# Patient Record
Sex: MALE | Race: WHITE | NOT HISPANIC OR LATINO | Employment: FULL TIME | URBAN - METROPOLITAN AREA
[De-identification: names, ages, dates, MRNs, and addresses within clinical notes are randomized per-mention and may not be internally consistent; named-entity substitution may affect disease eponyms.]

---

## 2017-07-11 ENCOUNTER — APPOINTMENT (OUTPATIENT)
Dept: RADIOLOGY | Facility: MEDICAL CENTER | Age: 22
End: 2017-07-11
Attending: EMERGENCY MEDICINE
Payer: COMMERCIAL

## 2017-07-11 ENCOUNTER — HOSPITAL ENCOUNTER (EMERGENCY)
Facility: MEDICAL CENTER | Age: 22
End: 2017-07-12
Attending: EMERGENCY MEDICINE
Payer: COMMERCIAL

## 2017-07-11 DIAGNOSIS — R00.2 PALPITATIONS: ICD-10-CM

## 2017-07-11 LAB
ALBUMIN SERPL BCP-MCNC: 4.5 G/DL (ref 3.2–4.9)
ALBUMIN/GLOB SERPL: 1.5 G/DL
ALP SERPL-CCNC: 48 U/L (ref 30–99)
ALT SERPL-CCNC: 22 U/L (ref 2–50)
ANION GAP SERPL CALC-SCNC: 12 MMOL/L (ref 0–11.9)
AST SERPL-CCNC: 21 U/L (ref 12–45)
BASOPHILS # BLD AUTO: 0.6 % (ref 0–1.8)
BASOPHILS # BLD: 0.04 K/UL (ref 0–0.12)
BILIRUB SERPL-MCNC: 1.1 MG/DL (ref 0.1–1.5)
BUN SERPL-MCNC: 14 MG/DL (ref 8–22)
CALCIUM SERPL-MCNC: 9.5 MG/DL (ref 8.4–10.2)
CHLORIDE SERPL-SCNC: 101 MMOL/L (ref 96–112)
CO2 SERPL-SCNC: 24 MMOL/L (ref 20–33)
CREAT SERPL-MCNC: 0.76 MG/DL (ref 0.5–1.4)
DEPRECATED D DIMER PPP IA-ACNC: <200 NG/ML(D-DU)
EKG IMPRESSION: NORMAL
EOSINOPHIL # BLD AUTO: 0.03 K/UL (ref 0–0.51)
EOSINOPHIL NFR BLD: 0.4 % (ref 0–6.9)
ERYTHROCYTE [DISTWIDTH] IN BLOOD BY AUTOMATED COUNT: 38.5 FL (ref 35.9–50)
GFR SERPL CREATININE-BSD FRML MDRD: >60 ML/MIN/1.73 M 2
GLOBULIN SER CALC-MCNC: 3 G/DL (ref 1.9–3.5)
GLUCOSE SERPL-MCNC: 85 MG/DL (ref 65–99)
HCT VFR BLD AUTO: 45.6 % (ref 42–52)
HGB BLD-MCNC: 16.1 G/DL (ref 14–18)
IMM GRANULOCYTES # BLD AUTO: 0.01 K/UL (ref 0–0.11)
IMM GRANULOCYTES NFR BLD AUTO: 0.1 % (ref 0–0.9)
LYMPHOCYTES # BLD AUTO: 2.84 K/UL (ref 1–4.8)
LYMPHOCYTES NFR BLD: 40.6 % (ref 22–41)
MCH RBC QN AUTO: 31.6 PG (ref 27–33)
MCHC RBC AUTO-ENTMCNC: 35.3 G/DL (ref 33.7–35.3)
MCV RBC AUTO: 89.6 FL (ref 81.4–97.8)
MONOCYTES # BLD AUTO: 0.46 K/UL (ref 0–0.85)
MONOCYTES NFR BLD AUTO: 6.6 % (ref 0–13.4)
NEUTROPHILS # BLD AUTO: 3.61 K/UL (ref 1.82–7.42)
NEUTROPHILS NFR BLD: 51.7 % (ref 44–72)
NRBC # BLD AUTO: 0 K/UL
NRBC BLD AUTO-RTO: 0 /100 WBC
PLATELET # BLD AUTO: 263 K/UL (ref 164–446)
PMV BLD AUTO: 10 FL (ref 9–12.9)
POTASSIUM SERPL-SCNC: 3.5 MMOL/L (ref 3.6–5.5)
PROT SERPL-MCNC: 7.5 G/DL (ref 6–8.2)
RBC # BLD AUTO: 5.09 M/UL (ref 4.7–6.1)
SODIUM SERPL-SCNC: 137 MMOL/L (ref 135–145)
TROPONIN I SERPL-MCNC: <0.02 NG/ML (ref 0–0.04)
TSH SERPL DL<=0.005 MIU/L-ACNC: 1.14 UIU/ML (ref 0.35–5.5)
WBC # BLD AUTO: 7 K/UL (ref 4.8–10.8)

## 2017-07-11 PROCEDURE — 84443 ASSAY THYROID STIM HORMONE: CPT

## 2017-07-11 PROCEDURE — 99284 EMERGENCY DEPT VISIT MOD MDM: CPT

## 2017-07-11 PROCEDURE — 93005 ELECTROCARDIOGRAM TRACING: CPT

## 2017-07-11 PROCEDURE — 36415 COLL VENOUS BLD VENIPUNCTURE: CPT

## 2017-07-11 PROCEDURE — 80053 COMPREHEN METABOLIC PANEL: CPT

## 2017-07-11 PROCEDURE — 84484 ASSAY OF TROPONIN QUANT: CPT

## 2017-07-11 PROCEDURE — 85379 FIBRIN DEGRADATION QUANT: CPT

## 2017-07-11 PROCEDURE — 85025 COMPLETE CBC W/AUTO DIFF WBC: CPT

## 2017-07-11 PROCEDURE — 71020 DX-CHEST-2 VIEWS: CPT

## 2017-07-11 ASSESSMENT — PAIN SCALES - GENERAL
PAINLEVEL_OUTOF10: 2
PAINLEVEL_OUTOF10: 0

## 2017-07-11 NOTE — ED AVS SNAPSHOT
[employer] Access Code: [email or web address]  Expires: 8/11/2017 12:11 AM    [employer]  A secure, online tool to manage your health information     [employer]’s [employer]® is a secure, online tool that connects you to your personalized health information from the privacy of your home -- day or night - making it very easy for you to manage your healthcare. Once the activation process is completed, you can even access your medical information using the [employer] jb, which is available for free in the Apple Jb store or Google Play store.     [employer] provides the following levels of access (as shown below):   My Chart Features   [facility] Primary Care Doctor [facility]  Specialists [facility]  Urgent  Care Non-[facility]  Primary Care  Doctor   Email your healthcare team securely and privately 24/7 X X X X   Manage appointments: schedule your next appointment; view details of past/upcoming appointments X      Request prescription refills. X      View recent personal medical records, including lab and immunizations X X X X   View health record, including health history, allergies, medications X X X X   Read reports about your outpatient visits, procedures, consult and ER notes X X X X   See your discharge summary, which is a recap of your hospital and/or ER visit that includes your diagnosis, lab results, and care plan. X X       How to register for [employer]:  1. Go to  [email or web address].  2. Click on the Sign Up Now box, which takes you to the New Member Sign Up page. You will need to provide the following information:  a. Enter your [employer] Access Code exactly as it appears at the top of this page. (You will not need to use this code after you’ve completed the sign-up process. If you do not sign up before the expiration date, you must request a new code.)   b. Enter your date of birth.   c. Enter your home email address.   d. Click Submit, and follow the next screen’s instructions.  3. Create a [employer] ID. This will be your [employer]  login ID and cannot be changed, so think of one that is secure and easy to remember.  4. Create a i[employer]fm password. You can change your password at any time.  5. Enter your Password Reset Question and Answer. This can be used at a later time if you forget your password.   6. Enter your e-mail address. This allows you to receive e-mail notifications when new information is available in iMedia.fm.  7. Click Sign Up. You can now view your health information.    For assistance activating your [employer]fm account, call [phone]

## 2017-07-11 NOTE — ED AVS SNAPSHOT
7/12/2017    Jersey Engel  [address] [place] [address]    Dear Jersey:    [facility] wants to ensure your discharge home is safe and you or your loved ones have had all of your questions answered regarding your care after you leave the hospital.    Below is a list of resources and contact information should you have any questions regarding your hospital stay, follow-up instructions, or active medical symptoms.    Questions or Concerns Regarding… Contact   Medical Questions Related to Your Discharge  (7 days a week, 8am-5pm) Contact a Nurse Care Coordinator   [phone]   Medical Questions Not Related to Your Discharge  (24 hours a day / 7 days a week)  Contact the Nurse Health Line   [phone]    Medications or Discharge Instructions Refer to your discharge packet   or contact your [facility] Primary Care Provider   [phone]   Follow-up Appointment(s) Schedule your appointment via IPDIA   or contact Scheduling [phone]   Billing Review your statement via IPDIA  or contact Billing [phone]   Medical Records Review your records via IPDIA   or contact Medical Records [phone]     You may receive a telephone call within two days of discharge. This call is to make certain you understand your discharge instructions and have the opportunity to have any questions answered. You can also easily access your medical information, test results and upcoming appointments via the IPDIA free online health management tool. You can learn more and sign up at [employer]Dex/IPDIA. For assistance setting up your IPDIA account, please call [phone].    Once again, we want to ensure your discharge home is safe and that you have a clear understanding of any next steps in your care. If you have any questions or concerns, please do not hesitate to contact us, we are here for you. Thank you for choosing [facility] for your healthcare needs.    Sincerely,    Your [facility] Healthcare Team

## 2017-07-11 NOTE — ED AVS SNAPSHOT
Home Care Instructions                                                                                                                Jersey Engel   MRN: [id number]    Department:  [facility], Emergency Dept   Date of Visit:  7/11/2017            [facility], Emergency Dept    [address]    [place] [address]    Phone:  [phone]      You were seen by     Liliana Doherty M.D.      Your Diagnosis Was     Palpitations     R00.2       Follow-up Information     1. Call [facility]ARDIOLOG[facility].    Specialty:  Cardiology    Why:  please call tomorrow morning to schedule follow-up appointment    Contact information    [id number] [address]  [phone]        2. Go to [facility], Emergency Dept.    Specialty:  Emergency Medicine    Why:  If symptoms worsen    Contact information    [id number] [phone]      Medication Information     Review all of your home medications and newly ordered medications with your primary doctor and/or pharmacist as soon as possible. Follow medication instructions as directed by your doctor and/or pharmacist.     Please keep your complete medication list with you and share with your physician. Update the information when medications are discontinued, doses are changed, or new medications (including over-the-counter products) are added; and carry medication information at all times in the event of emergency situations.               Medication List      ASK your doctor about these medications        Instructions    Morning Afternoon Evening Bedtime    ondansetron 4 MG Tabs tablet   Commonly known as:  ZOFRAN        Take 1 Tab by mouth every 8 hours as needed for Nausea/Vomiting.   Dose:  4 mg                                Procedures and tests performed during your visit     CBC w/ Differential    Complete Metabolic Panel (CMP)    D-DIMER    DX-CHEST-2 VIEWS    EKG (NOW)    ESTIMATED GFR    IV Saline Lock    TSH (for screening thyroid dysfunction)    Troponin STAT        Discharge Instructions       Please follow up with the cardiology clinic listed. Come tomorrow to schedule a follow-up appointment for further monitoring if indicated. Return to the emergency department if you develop pain, worsening symptoms, lightheadedness, shortness of breath or any further concerns.    Palpitations  A palpitation is the feeling that your heartbeat is irregular or is faster than normal. It may feel like your heart is fluttering or skipping a beat. Palpitations are usually not a serious problem. However, in some cases, you may need further medical evaluation.  CAUSES   Palpitations can be caused by:  · Smoking.  · Caffeine or other stimulants, such as diet pills or energy drinks.  · Alcohol.  · Stress and anxiety.  · Strenuous physical activity.  · Fatigue.  · Certain medicines.  · Heart disease, especially if you have a history of irregular heart rhythms (arrhythmias), such as atrial fibrillation, atrial flutter, or supraventricular tachycardia.  · An improperly working pacemaker or defibrillator.  DIAGNOSIS   To find the cause of your palpitations, your health care provider will take your medical history and perform a physical exam. Your health care provider may also have you take a test called an ambulatory electrocardiogram (ECG). An ECG records your heartbeat patterns over a 24-hour period. You may also have other tests, such as:  · Transthoracic echocardiogram (TTE). During echocardiography, sound waves are used to evaluate how blood flows through your heart.  · Transesophageal echocardiogram (EARNEST).  · Cardiac monitoring. This allows your health care provider to monitor your heart rate and rhythm in real time.  · Holter monitor. This is a portable device that records your heartbeat and can help diagnose heart arrhythmias. It allows your health care provider to track your heart  activity for several days, if needed.  · Stress tests by exercise or by giving medicine that makes the heart beat faster.  TREATMENT   Treatment of palpitations depends on the cause of your symptoms and can vary greatly. Most cases of palpitations do not require any treatment other than time, relaxation, and monitoring your symptoms. Other causes, such as atrial fibrillation, atrial flutter, or supraventricular tachycardia, usually require further treatment.  HOME CARE INSTRUCTIONS   · Avoid:  ¨ Caffeinated coffee, tea, soft drinks, diet pills, and energy drinks.  ¨ Chocolate.  ¨ Alcohol.  · Stop smoking if you smoke.  · Reduce your stress and anxiety. Things that can help you relax include:  ¨ A method of controlling things in your body, such as your heartbeats, with your mind (biofeedback).  ¨ Yoga.  ¨ Meditation.  ¨ Physical activity such as swimming, jogging, or walking.  · Get plenty of rest and sleep.  SEEK MEDICAL CARE IF:   · You continue to have a fast or irregular heartbeat beyond 24 hours.  · Your palpitations occur more often.  SEEK IMMEDIATE MEDICAL CARE IF:  · You have chest pain or shortness of breath.  · You have a severe headache.  · You feel dizzy or you faint.  MAKE SURE YOU:  · Understand these instructions.  · Will watch your condition.  · Will get help right away if you are not doing well or get worse.     This information is not intended to replace advice given to you by your health care provider. Make sure you discuss any questions you have with your health care provider.     Document Released: 12/15/2001 Document Revised: 12/23/2014 Document Reviewed: 02/15/2013  Elsevier Interactive Patient Education ©2016 [employer] Inc.            Patient Information     Patient Information    Following emergency treatment: all patient requiring follow-up care must return either to a private physician or a clinic if your condition worsens before you are able to obtain further medical attention, please  return to the emergency room.     Billing Information    At [facility], we work to make the billing process streamlined for our patients.  Our Representatives are here to answer any questions you may have regarding your hospital bill.  If you have insurance coverage and have supplied your insurance information to us, we will submit a claim to your insurer on your behalf.  Should you have any questions regarding your bill, we can be reached online or by phone as follows:  Online: You are able pay your bills online or live chat with our representatives about any billing questions you may have. We are here to help Monday - Friday from 8:00am to 7:30pm and 9:00am - 12:00pm on Saturdays.  Please visit [email or web address]  for more information.   Phone:  [phone] or [phone]    Please note that your emergency physician, surgeon, pathologist, radiologist, anesthesiologist, and other specialists are not employed by [facility] and will therefore bill separately for their services.  Please contact them directly for any questions concerning their bills at the numbers below:     Emergency Physician Services:  [phone]  [facility]:  [phone]  [facility]:  [phone]  [facility]:  [phone]    1. Your final bill may vary from the amount quoted upon discharge if all procedures are not complete at that time, or if your doctor has additional procedures of which we are not aware. You will receive an additional bill if you return to the Emergency Department at [facility] for suture removal regardless of the facility of which the sutures were placed.     2. Please arrange for settlement of this account at the emergency registration.    3. All self-pay accounts are due in full at the time of treatment.  If you are unable to meet this obligation then payment is expected within 4-5 days.     4. If you have had radiology studies  (CT, X-ray, Ultrasound, MRI), you have received a preliminary result during your emergency department visit. Please contact the radiology department [phone] to receive a copy of your final result. Please discuss the Final result with your primary physician or with the follow up physician provided.     Crisis Hotline:  [place] Crisis Hotline:  8-063-CMHMOCA or 1-627.855.1349  [place] Crisis Hotline:    1-443.551.5156 or 572-433-1836         ED Discharge Follow Up Questions    1. In order to provide you with very good care, we would like to follow up with a phone call in the next few days.  May we have your permission to contact you?     YES /  NO    2. What is the best phone number to call you? (       )_____-__________    3. What is the best time to call you?      Morning  /  Afternoon  /  Evening                   Patient Signature:  ____________________________________________________________    Date:  ____________________________________________________________

## 2017-07-12 VITALS
WEIGHT: 148.81 LBS | OXYGEN SATURATION: 98 % | BODY MASS INDEX: 23.36 KG/M2 | RESPIRATION RATE: 20 BRPM | HEART RATE: 64 BPM | HEIGHT: 67 IN | TEMPERATURE: 98.9 F | DIASTOLIC BLOOD PRESSURE: 93 MMHG | SYSTOLIC BLOOD PRESSURE: 147 MMHG

## 2017-07-12 PROCEDURE — 99284 EMERGENCY DEPT VISIT MOD MDM: CPT

## 2017-07-12 NOTE — ED NOTES
"Pt bib self with c/o of palpitations. Pt states he was having \"weird heart beats all day. It would speed up then feel really heavy.\" Denies SOB or difficulty breathing.   "

## 2017-07-12 NOTE — DISCHARGE INSTRUCTIONS
Please follow up with the cardiology clinic listed. Come tomorrow to schedule a follow-up appointment for further monitoring if indicated. Return to the emergency department if you develop pain, worsening symptoms, lightheadedness, shortness of breath or any further concerns.    Palpitations  A palpitation is the feeling that your heartbeat is irregular or is faster than normal. It may feel like your heart is fluttering or skipping a beat. Palpitations are usually not a serious problem. However, in some cases, you may need further medical evaluation.  CAUSES   Palpitations can be caused by:  · Smoking.  · Caffeine or other stimulants, such as diet pills or energy drinks.  · Alcohol.  · Stress and anxiety.  · Strenuous physical activity.  · Fatigue.  · Certain medicines.  · Heart disease, especially if you have a history of irregular heart rhythms (arrhythmias), such as atrial fibrillation, atrial flutter, or supraventricular tachycardia.  · An improperly working pacemaker or defibrillator.  DIAGNOSIS   To find the cause of your palpitations, your health care provider will take your medical history and perform a physical exam. Your health care provider may also have you take a test called an ambulatory electrocardiogram (ECG). An ECG records your heartbeat patterns over a 24-hour period. You may also have other tests, such as:  · Transthoracic echocardiogram (TTE). During echocardiography, sound waves are used to evaluate how blood flows through your heart.  · Transesophageal echocardiogram (EARNEST).  · Cardiac monitoring. This allows your health care provider to monitor your heart rate and rhythm in real time.  · Holter monitor. This is a portable device that records your heartbeat and can help diagnose heart arrhythmias. It allows your health care provider to track your heart activity for several days, if needed.  · Stress tests by exercise or by giving medicine that makes the heart beat faster.  TREATMENT   Treatment  of palpitations depends on the cause of your symptoms and can vary greatly. Most cases of palpitations do not require any treatment other than time, relaxation, and monitoring your symptoms. Other causes, such as atrial fibrillation, atrial flutter, or supraventricular tachycardia, usually require further treatment.  HOME CARE INSTRUCTIONS   · Avoid:  ¨ Caffeinated coffee, tea, soft drinks, diet pills, and energy drinks.  ¨ Chocolate.  ¨ Alcohol.  · Stop smoking if you smoke.  · Reduce your stress and anxiety. Things that can help you relax include:  ¨ A method of controlling things in your body, such as your heartbeats, with your mind (biofeedback).  ¨ Yoga.  ¨ Meditation.  ¨ Physical activity such as swimming, jogging, or walking.  · Get plenty of rest and sleep.  SEEK MEDICAL CARE IF:   · You continue to have a fast or irregular heartbeat beyond 24 hours.  · Your palpitations occur more often.  SEEK IMMEDIATE MEDICAL CARE IF:  · You have chest pain or shortness of breath.  · You have a severe headache.  · You feel dizzy or you faint.  MAKE SURE YOU:  · Understand these instructions.  · Will watch your condition.  · Will get help right away if you are not doing well or get worse.     This information is not intended to replace advice given to you by your health care provider. Make sure you discuss any questions you have with your health care provider.     Document Released: 12/15/2001 Document Revised: 12/23/2014 Document Reviewed: 02/15/2013  [employer] Interactive Patient Education ©2016 [employer]

## 2017-07-12 NOTE — ED PROVIDER NOTES
"ED Provider Note    Chief Complaint:   Palpitations    HPI:  Jersey Engel is a 22 y.o. male who presents with sensation of palpitations and chest heaviness. Symptom onset was earlier today lasting several hours, symptoms had resolved on my evaluation in the emergency department. He had no associated lightheadedness, no true chest pain, just a sensation of \"a very strong and rapid heartbeat\". Denies associated shortness of breath.    He has no cardiac risk factors. No family history of early cardiac disease or early sudden death. He did recently complete an extensive road trip to [place] with several days of driving. He has no history of blood clots, no history of malignancy. Denies any leg pain or leg swelling. No history of impaired immunity, no abnormal bleeding or bruising. No headaches, no neck pain. No extremity pain or swelling. No history of hyperglycemia, no polydipsia, no polyuria. Denies caffeine ingestion, denies dietary supplements, denies recreational drug or alcohol use.    Review of Systems:  See HPI for pertinent positives and negatives. All other systems negative.    Past Medical History:       Social History:  Social History     Social History Main Topics   • Smoking status: Current Some Day Smoker     Types: Pipe   • Smokeless tobacco: Never Used   • Alcohol Use: Yes      Comment: heavy last few days   • Drug Use: Yes      Comment: cocaine (snorted)   • Sexual Activity: Not on file       Surgical History:   has past surgical history that includes other neurological surg.    Current Medications:  Home Medications     **Home medications have not yet been reviewed for this encounter**          Allergies:  No Known Allergies    Physical Exam:  Vital Signs: /93 mmHg  Pulse 64  Temp(Src) 37.2 °C (98.9 °F)  Resp 20  Ht 1.702 m (5' 7\")  Wt 67.5 kg (148 lb 13 oz)  BMI 23.30 kg/m2  SpO2 98%  Constitutional: Alert, no acute distress  HENT: Normocephalic, atraumatic, moist mucus membranes  Eyes: " Pupils equal and reactive, normal conjunctiva, non-icteric  Neck: Supple, normal range of motion, no stridor  Cardiovascular: Normal peripheral perfusion, no murmer, no cyanosis, normal cardiac auscultation  Pulmonary: No respiratory distress, normal work of breathing, no accessory muscule usage, breath sounds clear and equal bilaterally  Abdomen: Bowel sounds present, non-tender, non-distended with no peritoneal signs, no palpable masses  Skin: Warm, dry, no rashes or lesions  Back: No pain with active range of motion  Musculoskeletal: Normal range of motion in all extremities, no swelling or deformity noted  Neurologic: Alert, oriented, normal motor function, no speech deficits  Psychiatric: Normal and appropriate mood and affect    EKG:  Rate 70, normal sinus rhythm, no ST elevation or depression, no T-wave inversion, leads V2 V3 consistent with benign early repolarization, baseline wander somewhat limits interpretation.    Labs:  Labs Reviewed   COMP METABOLIC PANEL - Abnormal; Notable for the following:     Potassium 3.5 (*)     Anion Gap 12.0 (*)     All other components within normal limits   CBC WITH DIFFERENTIAL   TROPONIN   TSH   D-DIMER   ESTIMATED GFR       Radiology:  DX-CHEST-2 VIEWS   Final Result         1.  No acute cardiopulmonary disease.           Differential diagnosis:  Cardiac arrhythmia, pulmonary embolism, caffeine or other substance use    MDM:  Patient presents with palpitations and sensation of chest heaviness. Symptoms have resolved on arrival to the emergency department. He has a normal heart rate here, no ectopy, no delta waves on EKG. He has no cardiac risk factors. No history of fever, no history of injection drug use, doubt infectious endocarditis or myocarditis. No diffuse ST elevations to suggest pericarditis. He did very recently complete an extended road trip. Otherwise he has no risk factors for pulmonary embolism. D-dimer ordered for further risk stratification and this  patient with low pretest probability. D-dimer is negative, I do not believe CT is indicated at this time. He is normotensive, has no tachycardia, has no hypoxia nor tachypnea. Hemoglobin is normal, no evidence of anemia. TSH is within normal limits. Troponin is undetectable.    At this time etiology of his palpitations is unclear. As he did not have chest pain, did not have lightheadedness, did not have near syncope and he believe he is safe for discharge home and follow-up as an outpatient. He is referred to cardiology for further evaluation and Holter monitoring if necessary. He will return to the emergency department if any of his symptoms recur prior to that visit or if he develops new or worsening symptoms including lightheadedness, chest pain, shortness of breath or any further concerns.    Blood pressure today is greater than 120/80, pt instructed to follow up with primary care for recheck    Disposition:  Discharged home in stable condition    Final Impression:  1. Palpitations          Electronically signed by: Liliana Doherty, 7/11/2017 10:34 PM

## 2017-07-12 NOTE — ED NOTES
Labs in process.  Pt wants to hold off on CXR until he speaks with a PAR regarding costs; will inform ERP.

## 2020-11-19 ENCOUNTER — NURSE TRIAGE (OUTPATIENT)
Dept: HEALTH INFORMATION MANAGEMENT | Facility: OTHER | Age: 25
End: 2020-11-19

## 2020-11-19 NOTE — TELEPHONE ENCOUNTER
"    Reason for Disposition  • [1] Fever (or feeling feverish) OR cough AND [2] within 14 Days of COVID-19 EXPOSURE (Close Contact)    Additional Information  • Negative: SEVERE difficulty breathing (e.g., struggling for each breath, speak in single words, bluish lips)  • Negative: Sounds like a life-threatening emergency to the triager  • Negative: [1] Adult has symptoms of COVID-19 (fever, cough, or SOB) AND [2] lab test positive  • Negative: [1] Adult has symptoms of COVID-19 (fever, cough or SOB) AND [2] major community spread where patient lives AND [3] testing not being done for mild symptoms  • Negative: [1] Difficulty breathing (shortness of breath) occurs AND [2] onset > 14 days after COVID-19 EXPOSURE (Close Contact) AND [3] no major community spread  • Negative: [1] Cough occurs AND [2] onset > 14 days after COVID-19 EXPOSURE AND [3] no major community spread  • Negative: [1] Common cold symptoms AND [2] onset > 14 days after COVID-19 EXPOSURE AND [3] no major community spread  • Negative: [1] Difficulty breathing occurs AND [2] within 14 days of COVID-19 EXPOSURE (Close Contact)  • Negative: Patient sounds very sick or weak to the triager    Answer Assessment - Initial Assessment Questions  1. CLOSE CONTACT: \"Who is the person with the confirmed or suspected COVID-19 infection that you were exposed to?\"      Family member  2. PLACE of CONTACT: \"Where were you when you were exposed to COVID-19?\" (e.g., home, school, medical waiting room; which city?)      home  3. TYPE of CONTACT: \"How much contact was there?\" (e.g., sitting next to, live in same house, work in same office, same building)      Close contact  4. DURATION of CONTACT: \"How long were you in contact with the COVID-19 patient?\" (e.g., a few seconds, passed by person, a few minutes, live with the patient)      More than 20 minutes  5. DATE of CONTACT: \"When did you have contact with a COVID-19 patient?\" (e.g., how many days ago)      Few days " "ago  6. TRAVEL: \"Have you traveled out of the country recently?\" If so, \"When and where?\"      * Also ask about out-of-state travel, since the [facility] has identified some high risk cities for community spread in the .      * Note: Travel becomes less relevant if there is widespread community transmission where the patient lives.         7. COMMUNITY SPREAD: \"Are there lots of cases of COVID-19 (community spread) where you live?\" (See public health department website, if unsure)    * MAJOR community spread: high number of cases; numbers of cases are increasing; many people hospitalized.    * MINOR community spread: low number of cases; not increasing; few or no people hospitalized      major  8. SYMPTOMS: \"Do you have any symptoms?\" (e.g., fever, cough, breathing difficulty)      Nasal congestion,Loss of taste - onset 2-3 days ago. No fever, no cough, no N/V/D   9. PREGNANCY OR POSTPARTUM: \"Is there any chance you are pregnant?\" \"When was your last menstrual period?\" \"Did you deliver in the last 2 weeks?\"      no  10. HIGH RISK: \"Do you have any heart or lung problems? Do you have a weak immune system?\" (e.g., CHF, COPD, asthma, HIV positive, chemotherapy, renal failure, diabetes mellitus, sickle cell anemia)        No    Pt states has Medicaid. Advised to go to  at [facility].    Protocols used: CORONAVIRUS (COVID-19) EXPOSURE-A-[place]      "

## 2021-06-10 ENCOUNTER — OFFICE VISIT (OUTPATIENT)
Dept: URGENT CARE | Facility: CLINIC | Age: 26
End: 2021-06-10
Payer: MEDICAID

## 2021-06-10 ENCOUNTER — APPOINTMENT (OUTPATIENT)
Dept: RADIOLOGY | Facility: IMAGING CENTER | Age: 26
End: 2021-06-10
Attending: NURSE PRACTITIONER
Payer: MEDICAID

## 2021-06-10 VITALS
HEIGHT: 67 IN | DIASTOLIC BLOOD PRESSURE: 76 MMHG | SYSTOLIC BLOOD PRESSURE: 122 MMHG | TEMPERATURE: 98 F | OXYGEN SATURATION: 96 % | RESPIRATION RATE: 16 BRPM | HEART RATE: 87 BPM | BODY MASS INDEX: 23.29 KG/M2 | WEIGHT: 148.4 LBS

## 2021-06-10 DIAGNOSIS — M25.511 ACUTE PAIN OF RIGHT SHOULDER: ICD-10-CM

## 2021-06-10 DIAGNOSIS — S40.011A TRAUMATIC ECCHYMOSIS OF RIGHT SHOULDER, INITIAL ENCOUNTER: ICD-10-CM

## 2021-06-10 DIAGNOSIS — S42.031A CLOSED DISPLACED FRACTURE OF ACROMIAL END OF RIGHT CLAVICLE, INITIAL ENCOUNTER: ICD-10-CM
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
## 2021-06-10 PROCEDURE — 99204 OFFICE O/P NEW MOD 45 MIN: CPT | Performed by: NURSE PRACTITIONER

## 2021-06-10 PROCEDURE — 73030 X-RAY EXAM OF SHOULDER: CPT | Mod: TC,RT | Performed by: NURSE PRACTITIONER

## 2021-06-10 RX ORDER — IBUPROFEN 800 MG/1
800 TABLET ORAL EVERY 8 HOURS PRN
Qty: 30 TABLET | Refills: 0 | Status: SHIPPED | OUTPATIENT
Start: 2021-06-10

## 2021-06-10 ASSESSMENT — ENCOUNTER SYMPTOMS
MYALGIAS: 1
CARDIOVASCULAR NEGATIVE: 1
EYES NEGATIVE: 1
CONSTITUTIONAL NEGATIVE: 1
GASTROINTESTINAL NEGATIVE: 1
RESPIRATORY NEGATIVE: 1
PSYCHIATRIC NEGATIVE: 1
NEUROLOGICAL NEGATIVE: 1
FALLS: 1

## 2021-06-10 NOTE — LETTER
Ximena 10, 2021         Patient: Jersey Engel   YOB: 1995   Date of Visit: 6/10/2021           To Whom it May Concern:    Jersey Engel was seen in my clinic on 6/10/2021. Please excuse his absence today and tomorrow, 6/11/2021.  Please allow him to work on a light duty basis with his right arm due to an acute injury.     If you have any questions or concerns, please don't hesitate to call.        Sincerely,           GRAZYNA Olsen.  Electronically Signed

## 2021-06-10 NOTE — PROGRESS NOTES
Subjective:   Jersey Engel is a 26 y.o. male who presents for Shoulder Injury (x 3 days pt worried of bruise getting worse on right shoulder.  )       Shoulder Injury   The incident occurred at home. The right shoulder is affected. The incident occurred 3 to 5 days ago. The injury mechanism was a fall. The quality of the pain is described as aching. The pain does not radiate. The pain is mild. The symptoms are aggravated by movement, overhead lifting and palpation. He has tried ice, NSAIDs and rest for the symptoms. The treatment provided mild relief.     Pt presents for evaluation of a new problem, reports walking into his house when his small dog ran in between his legs causing him to trip and fall.  Patient states that he fell forward and landed with his right shoulder hitting the ground.  Patient had pain with some swelling at that time.  He is concerned due to continued pain and bruising is progressing down his right pectoralis muscle.  Patient has minimal pain, however states that he has a high pain tolerance.  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  Denies numbness, tingling, or changes in sensation involving his right upper extremity.    Review of Systems   Constitutional: Negative.    HENT: Negative.    Eyes: Negative.    Respiratory: Negative.    Cardiovascular: Negative.    Gastrointestinal: Negative.    Genitourinary: Negative.    Musculoskeletal: Positive for falls, joint pain and myalgias.   Skin: Negative.    Neurological: Negative.    Psychiatric/Behavioral: Negative.    All other systems reviewed and are negative.      MEDS: No current outpatient medications on file.  ALLERGIES: No Known Allergies    Patient's PMH, SocHx, SurgHx, FamHx, Drug allergies and medications were reviewed.     Objective:   /76 (BP Location: Left arm, Patient Position: Sitting, BP Cuff Size: Adult)   Pulse 87   Temp 36.7 °C (98 °F) (Temporal)   Resp  "16   Ht 1.69 m (5' 6.54\")   Wt 67.3 kg (148 lb 6.4 oz)   SpO2 96%   BMI 23.57 kg/m²     Physical Exam  Vitals and nursing note reviewed.   Constitutional:       General: He is awake.      Appearance: Normal appearance. He is well-developed.   HENT:      Head: Normocephalic and atraumatic.      Right Ear: External ear normal.      Left Ear: External ear normal.      Nose: Nose normal.      Mouth/Throat:      Lips: Pink.      Mouth: Mucous membranes are moist.      Pharynx: Oropharynx is clear.   Eyes:      General: Lids are normal.      Extraocular Movements: Extraocular movements intact.      Conjunctiva/sclera: Conjunctivae normal.   Cardiovascular:      Rate and Rhythm: Normal rate and regular rhythm.   Pulmonary:      Effort: Pulmonary effort is normal.   Musculoskeletal:         General: Normal range of motion.      Right shoulder: Swelling, deformity and bony tenderness present.        Arms:       Cervical back: Normal range of motion.      Comments: Patient has significant ecchymosis in varying stages from his clavicle down to low his right nipple.  Deformity with some tenderness lateral clavicle. + empty can testing; additional ROM activities intact.     Skin:     General: Skin is warm and dry.   Neurological:      Mental Status: He is alert and oriented to person, place, and time.   Psychiatric:         Mood and Affect: Mood normal.         Behavior: Behavior normal. Behavior is cooperative.         Thought Content: Thought content normal.         Judgment: Judgment normal.       Narrative & Impression        HISTORY/REASON FOR EXAM:  Pain/Deformity Following Trauma        TECHNIQUE/EXAM DESCRIPTION AND NUMBER OF VIEWS:  3 views of the right shoulder, 6/10/2021 3:47 PM.     COMPARISON: None     FINDINGS:  There is a displaced fracture of the lateral right clavicle. The acromioclavicular joint is intact. There is no evidence of dislocation.     IMPRESSION:     Displaced lateral right clavicle " fracture.              INTERPRETING LOCATION:  [address] [place] [address] DIANA MAI, [id number]         Last Resulted: 06/10/21  4:01 PM          Assessment/Plan:   Assessment    1. Closed displaced fracture of acromial end of right clavicle, initial encounter  - ibuprofen (MOTRIN) 800 MG Tab; Take 1 tablet by mouth every 8 hours as needed for Moderate Pain or Inflammation for up to 30 doses.  Dispense: 30 tablet; Refill: 0  - REFERRAL TO SPORTS MEDICINE    2. Traumatic ecchymosis of right shoulder, initial encounter  - DX-SHOULDER 2+ RIGHT; Future  - ibuprofen (MOTRIN) 800 MG Tab; Take 1 tablet by mouth every 8 hours as needed for Moderate Pain or Inflammation for up to 30 doses.  Dispense: 30 tablet; Refill: 0  - REFERRAL TO SPORTS MEDICINE    3. Fall from ground level  - DX-SHOULDER 2+ RIGHT; Future  - ibuprofen (MOTRIN) 800 MG Tab; Take 1 tablet by mouth every 8 hours as needed for Moderate Pain or Inflammation for up to 30 doses.  Dispense: 30 tablet; Refill: 0  - REFERRAL TO SPORTS MEDICINE    4. Acute pain of right shoulder  - DX-SHOULDER 2+ RIGHT; Future  - ibuprofen (MOTRIN) 800 MG Tab; Take 1 tablet by mouth every 8 hours as needed for Moderate Pain or Inflammation for up to 30 doses.  Dispense: 30 tablet; Refill: 0  - REFERRAL TO SPORTS MEDICINE    Vital signs stable at today's acute urgent care visit.  Reviewed test results that were completed in the clinic, which reveal a closed displaced fracture of the right clavicle.  Patient provided sling and recommend to use as little as possible until followed up with sports medicine.  Sports medicine referral placed.  Begin medications as listed on a PRN basis. Discussed management options (risks, benefits, and alternatives to treatment).     Advised the patient to follow-up with the primary care provider for recheck, reevaluation, and/or consideration of further management if necessary. Return to urgent care with any worsening symptoms or if there is no improvement in their  current condition. Red flags discussed and indications to immediately call 911 or present to the ED.  All questions were encouraged and answered to the patient's satisfaction and understanding, and they agree to the plan of care.     I personally reviewed prior external notes and test results pertinent to today's visit.  I have independently reviewed and interpreted all diagnostics ordered during this urgent care acute visit. Time spent evaluating this patient was a minimum of 30 minutes and includes preparing for visit, counseling/education, exam, evaluation, obtaining history, and ordering lab/test/procedures.      Please note that this dictation was created using voice recognition software. I have made a reasonable attempt to correct obvious errors, but I expect that there are errors of grammar and possibly content that I did not discover before finalizing the note.

## 2021-11-27 ENCOUNTER — APPOINTMENT (OUTPATIENT)
Dept: RADIOLOGY | Facility: MEDICAL CENTER | Age: 26
End: 2021-11-27
Attending: EMERGENCY MEDICINE
Payer: MEDICAID

## 2021-11-27 ENCOUNTER — HOSPITAL ENCOUNTER (EMERGENCY)
Facility: MEDICAL CENTER | Age: 26
End: 2021-11-28
Attending: EMERGENCY MEDICINE
Payer: MEDICAID

## 2021-11-27 VITALS
DIASTOLIC BLOOD PRESSURE: 91 MMHG | SYSTOLIC BLOOD PRESSURE: 138 MMHG | HEIGHT: 68 IN | BODY MASS INDEX: 23.59 KG/M2 | RESPIRATION RATE: 20 BRPM | OXYGEN SATURATION: 97 % | TEMPERATURE: 97.1 F | WEIGHT: 155.65 LBS | HEART RATE: 88 BPM

## 2021-11-27 DIAGNOSIS — R42 DIZZINESS: ICD-10-CM

## 2021-11-27 DIAGNOSIS — F10.920 ALCOHOLIC INTOXICATION WITHOUT COMPLICATION (HCC): ICD-10-CM

## 2021-11-27 LAB
ALBUMIN SERPL BCP-MCNC: 4.9 G/DL (ref 3.2–4.9)
ALBUMIN/GLOB SERPL: 1.6 G/DL
ALP SERPL-CCNC: 58 U/L (ref 30–99)
ALT SERPL-CCNC: 31 U/L (ref 2–50)
AMPHET UR QL SCN: NEGATIVE
ANION GAP SERPL CALC-SCNC: 15 MMOL/L (ref 7–16)
AST SERPL-CCNC: 41 U/L (ref 12–45)
BARBITURATES UR QL SCN: NEGATIVE
BASOPHILS # BLD AUTO: 1 % (ref 0–1.8)
BASOPHILS # BLD: 0.05 K/UL (ref 0–0.12)
BENZODIAZ UR QL SCN: NEGATIVE
BILIRUB SERPL-MCNC: 0.2 MG/DL (ref 0.1–1.5)
BUN SERPL-MCNC: 8 MG/DL (ref 8–22)
BZE UR QL SCN: NEGATIVE
CALCIUM SERPL-MCNC: 8.8 MG/DL (ref 8.4–10.2)
CANNABINOIDS UR QL SCN: NEGATIVE
CHLORIDE SERPL-SCNC: 105 MMOL/L (ref 96–112)
CO2 SERPL-SCNC: 24 MMOL/L (ref 20–33)
CREAT SERPL-MCNC: 0.67 MG/DL (ref 0.5–1.4)
EKG IMPRESSION: NORMAL
EOSINOPHIL # BLD AUTO: 0.01 K/UL (ref 0–0.51)
EOSINOPHIL NFR BLD: 0.2 % (ref 0–6.9)
ERYTHROCYTE [DISTWIDTH] IN BLOOD BY AUTOMATED COUNT: 44.1 FL (ref 35.9–50)
ETHANOL BLD-MCNC: 391.6 MG/DL (ref 0–10)
GLOBULIN SER CALC-MCNC: 3.1 G/DL (ref 1.9–3.5)
GLUCOSE SERPL-MCNC: 102 MG/DL (ref 65–99)
HCT VFR BLD AUTO: 48.4 % (ref 42–52)
HGB BLD-MCNC: 16.3 G/DL (ref 14–18)
IMM GRANULOCYTES # BLD AUTO: 0.01 K/UL (ref 0–0.11)
IMM GRANULOCYTES NFR BLD AUTO: 0.2 % (ref 0–0.9)
LYMPHOCYTES # BLD AUTO: 2.45 K/UL (ref 1–4.8)
LYMPHOCYTES NFR BLD: 49.6 % (ref 22–41)
MCH RBC QN AUTO: 33.1 PG (ref 27–33)
MCHC RBC AUTO-ENTMCNC: 33.7 G/DL (ref 33.7–35.3)
MCV RBC AUTO: 98.4 FL (ref 81.4–97.8)
METHADONE UR QL SCN: NEGATIVE
MONOCYTES # BLD AUTO: 0.36 K/UL (ref 0–0.85)
MONOCYTES NFR BLD AUTO: 7.3 % (ref 0–13.4)
NEUTROPHILS # BLD AUTO: 2.06 K/UL (ref 1.82–7.42)
NEUTROPHILS NFR BLD: 41.7 % (ref 44–72)
NRBC # BLD AUTO: 0 K/UL
NRBC BLD-RTO: 0 /100 WBC
OPIATES UR QL SCN: NEGATIVE
OXYCODONE UR QL SCN: NEGATIVE
PCP UR QL SCN: NEGATIVE
PLATELET # BLD AUTO: 278 K/UL (ref 164–446)
PMV BLD AUTO: 9.2 FL (ref 9–12.9)
POC BREATHALIZER: 0.24 PERCENT (ref 0–0.01)
POTASSIUM SERPL-SCNC: 4.1 MMOL/L (ref 3.6–5.5)
PROPOXYPH UR QL SCN: NEGATIVE
PROT SERPL-MCNC: 8 G/DL (ref 6–8.2)
RBC # BLD AUTO: 4.92 M/UL (ref 4.7–6.1)
SODIUM SERPL-SCNC: 144 MMOL/L (ref 135–145)
WBC # BLD AUTO: 4.9 K/UL (ref 4.8–10.8)

## 2021-11-27 PROCEDURE — A9270 NON-COVERED ITEM OR SERVICE: HCPCS | Performed by: EMERGENCY MEDICINE

## 2021-11-27 PROCEDURE — 82077 ASSAY SPEC XCP UR&BREATH IA: CPT

## 2021-11-27 PROCEDURE — 302970 POC BREATHALIZER: Performed by: EMERGENCY MEDICINE

## 2021-11-27 PROCEDURE — 99284 EMERGENCY DEPT VISIT MOD MDM: CPT

## 2021-11-27 PROCEDURE — 70498 CT ANGIOGRAPHY NECK: CPT

## 2021-11-27 PROCEDURE — 80307 DRUG TEST PRSMV CHEM ANLYZR: CPT

## 2021-11-27 PROCEDURE — 85025 COMPLETE CBC W/AUTO DIFF WBC: CPT

## 2021-11-27 PROCEDURE — 93005 ELECTROCARDIOGRAM TRACING: CPT

## 2021-11-27 PROCEDURE — 700117 HCHG RX CONTRAST REV CODE 255: Performed by: EMERGENCY MEDICINE

## 2021-11-27 PROCEDURE — 80053 COMPREHEN METABOLIC PANEL: CPT

## 2021-11-27 PROCEDURE — 700102 HCHG RX REV CODE 250 W/ 637 OVERRIDE(OP): Performed by: EMERGENCY MEDICINE

## 2021-11-27 PROCEDURE — 93005 ELECTROCARDIOGRAM TRACING: CPT | Performed by: EMERGENCY MEDICINE

## 2021-11-27 PROCEDURE — 70496 CT ANGIOGRAPHY HEAD: CPT

## 2021-11-27 RX ORDER — MECLIZINE HYDROCHLORIDE 25 MG/1
25 TABLET ORAL ONCE
Status: COMPLETED | OUTPATIENT
Start: 2021-11-27 | End: 2021-11-27

## 2021-11-27 RX ADMIN — MECLIZINE HYDROCHLORIDE 25 MG: 25 TABLET ORAL at 22:38

## 2021-11-27 RX ADMIN — IOHEXOL 80 ML: 350 INJECTION, SOLUTION INTRAVENOUS at 23:28

## 2021-11-28 NOTE — ED NOTES
Patient is stable for discharge at this time per ERP, anticipatory guidance provided, close follow-up is encouraged, and ED return instructions have been detailed. Patient is both agreeable to the disposition and plan, and discharged home in ambulatory state and in good condition.     Pt stated that his friend is picking him up to take him home;

## 2021-11-28 NOTE — ED PROVIDER NOTES
"ED Provider Note    CHIEF COMPLAINT  Chief Complaint   Patient presents with   • Dizziness     started tonight while driving, reports feeling \"worse than drunk\"  denies HA, vison changes, chest pains. Ambulates in triage with steady gait       HPI  Jersey Engel is a 26 y.o. male who presents with severe dizziness starting at around 6 PM today.  He states that he was driving at the time of sudden onset.  He states that he had to pull over due to severe dizziness.  He estimates that it is around 7 out of 10 in terms of severity.    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  He also had prior history of seizures though he is not on any antiepileptic medications.    The patient also notes that about 2 or 3 weeks ago he suffered a blunt head trauma requiring laceration repair to the left temporal scalp and had CT imaging of the head at an outside hospital that was unremarkable.    REVIEW OF SYSTEMS  See HPI for further details. All other systems are negative.     PAST MEDICAL HISTORY       SOCIAL HISTORY  Social History     Tobacco Use   • Smoking status: Former Smoker     Types: Pipe   • Smokeless tobacco: Never Used   Vaping Use   • Vaping Use: Some days   • Substances: Nicotine   Substance and Sexual Activity   • Alcohol use: Yes     Alcohol/week: 1.8 oz     Types: 3 Standard drinks or equivalent per week     Comment: daily   • Drug use: Not Currently     Comment: cocain (snorted)   • Sexual activity: Not on file       SURGICAL HISTORY   has a past surgical history that includes other neurological surg.    CURRENT MEDICATIONS  Home Medications    **Home medications have not yet been reviewed for this encounter**         ALLERGIES  No Known Allergies    PHYSICAL EXAM  VITAL SIGNS: /102   Pulse 86   Temp 35.9 °C (96.7 °F) (Temporal)   Resp 16   Ht 1.727 m (5' 8\")   Wt 70.6 kg (155 lb 10.3 oz)   SpO2 98%   " BMI 23.67 kg/m²   Pulse ox interpretation: I interpret this pulse ox as normal.  Constitutional: Alert in no apparent distress.  HENT: No signs of trauma, Bilateral external ears normal, Nose normal.   Eyes: Pupils are equal and reactive, Conjunctiva normal, Non-icteric.   Neck: Normal range of motion, No tenderness, Supple, No stridor.   Lymphatic: No lymphadenopathy noted.   Cardiovascular: Regular rate and rhythm.   Thorax & Lungs: Normal breath sounds, No respiratory distress, No wheezing, No chest tenderness.   Abdomen: Bowel sounds normal, Soft, No tenderness, No masses, No pulsatile masses. No peritoneal signs.  Skin: Warm, Dry, No erythema, No rash.   Back: No bony tenderness, No CVA tenderness.   Extremities: Intact distal pulses, No edema, No tenderness, No cyanosis  Musculoskeletal: Good range of motion in all major joints. No tenderness to palpation or major deformities noted.   Neurologic: Alert, Normal motor function and gait, Normal sensory function, No focal deficits noted.   Psychiatric: Affect normal, Judgment normal, Mood normal.       DIAGNOSTIC STUDIES / PROCEDURES    EKG - Physician interpretation  Results for orders placed or performed during the hospital encounter of 21   EKG   Result Value Ref Range    Report       [facility].    Test Date:  2021  Pt Name:    LEODAN RASMUSSEN                 Department: [facility]  MRN:        [id number]                      Room:  Gender:     Male                         Technician: SNEHA  :        1995                   Requested By:ER TRIAGE PROTOCOL  Order #:    [id number]                    Reading MD: MAGGI OLGUIN MD    Measurements  Intervals                                Axis  Rate:       78                           P:          49  FL:         132                          QRS:        81  QRSD:       100                          T:          39  QT:         344  QTc:        392    Interpretive  Statements  UNKNOWN RHYTHM, IRREGULAR RATE  67- 93  Compared to ECG 07/11/2017 21:25:31  No significant changes  Electronically Signed On 11- 22:33:03 PST by MAGGI OLGUIN MD           LABS  Labs Reviewed   CBC WITH DIFFERENTIAL - Abnormal; Notable for the following components:       Result Value    MCV 98.4 (*)     MCH 33.1 (*)     Neutrophils-Polys 41.70 (*)     Lymphocytes 49.60 (*)     All other components within normal limits   COMP METABOLIC PANEL - Abnormal; Notable for the following components:    Glucose 102 (*)     All other components within normal limits   ETHYL ALCOHOL (BLOOD) - Abnormal; Notable for the following components:    Diagnostic Alcohol 391.6 (*)     All other components within normal limits   POC BREATHALIZER - Abnormal; Notable for the following components:    POC Breathalizer 0.243 (*)     All other components within normal limits   URINE DRUG SCREEN   ESTIMATED GFR   DIAGNOSTIC ALCOHOL         RADIOLOGY  CT-CTA NECK WITH & W/O-POST PROCESSING   Final Result      1.  CT angiogram of the neck within normal limits.      2.  Arch origin of the left vertebral artery.      CT-CTA HEAD WITH & W/O-POST PROCESS   Final Result      CT angiogram of the [place] within normal limits.            COURSE & MEDICAL DECISION MAKING    Medications   meclizine (ANTIVERT) tablet 25 mg (25 mg Oral Given 11/27/21 2238)   iohexol (OMNIPAQUE) 350 mg/mL (IV) (80 mL Intravenous Given 11/27/21 2328)       Pertinent Labs & Imaging studies reviewed. (See chart for details)  26 y.o. male presenting with dizziness.  He states that he was driving today and felt extremely dizzy where he had to pull over.  It started suddenly.  He feels like the whole room is spinning.  Had a slight headache as well.  No other focal neurologic deficits.  No numbness or weakness in his arms or legs.  He does have slightly unsteady gait upon arrival.  No dysmetria on physical examination.  No significant nystagmus.    Patient  has a rather extensive prior neurologic history including meningitis with a subdural empyema in the past requiring surgical intervention.  He also has recent traumatic injury to the left temporal region resulting in multiple staples.  He had a negative CT exam according to report at that time.    The patient states that he only had 1 or 2 drinks earlier in the evening and states that he is not intoxicated.    Laboratory studies were performed and CT head was ordered.  CT was performed to evaluate for possible vascular abnormality such as vertebral artery dissection or other posterior circulation abnormality that may result in persistent dizziness.  The patient does not have tenderness.  Bilateral tympanic membranes are normal.  This is not paroxysmal and appears to be persistent over the past several hours.    CT was fortunately unremarkable.    Laboratory studies resulted and showed that the patient was significantly intoxicated with a blood alcohol level of 391.  The patient was informed regarding this result and he appeared generally shocked.  He does have a rather steady gait currently.  Speaking clearly.  He does appear to intoxicated to drive home and he was instructed to get a ride home.  He does appear to be mentally aware enough to be discharged.  He did perform a breathalyzer exam as well showing a decrease in his blood alcohol level.  He does still appear to intoxicated to drive home.  He does live with other people who can keep an eye on him overnight to ensure that he does well.    This severe intoxication is likely the cause of the patient's dizziness today.  No obvious signs of a central nervous system lesion to cause persistent dizziness.  Prior to discharge, the patient does report feeling improved.    The patient was instructed to follow-up with primary care physician for further management.  To return immediately for any worsening symptoms or development of any other concerning signs or symptoms.  "The patient verbalizes understanding in their own words.    /91   Pulse 88   Temp 36.2 °C (97.1 °F) (Temporal)   Resp 20   Ht 1.727 m (5' 8\")   Wt 70.6 kg (155 lb 10.3 oz)   SpO2 97%   BMI 23.67 kg/m²     The patient was referred to primary care where they will receive further BP management.      [facility], Emergency Dept  [address]  [facility] [id number] [phone]    As needed, If symptoms worsen    Primary care doctor    Schedule an appointment as soon as possible for a visit         FINAL IMPRESSION  1. Dizziness    2. Alcoholic intoxication without complication (HCC)            Electronically signed by: Clifton Bennett M.D., 11/27/2021 10:19 PM    "

## 2021-12-04 ENCOUNTER — OFFICE VISIT (OUTPATIENT)
Dept: URGENT CARE | Facility: CLINIC | Age: 26
End: 2021-12-04
Payer: MEDICAID

## 2021-12-04 VITALS
BODY MASS INDEX: 23.34 KG/M2 | DIASTOLIC BLOOD PRESSURE: 60 MMHG | HEIGHT: 68 IN | TEMPERATURE: 97.4 F | WEIGHT: 154 LBS | OXYGEN SATURATION: 97 % | HEART RATE: 103 BPM | RESPIRATION RATE: 20 BRPM | SYSTOLIC BLOOD PRESSURE: 122 MMHG

## 2021-12-04 DIAGNOSIS — S09.90XD CLOSED HEAD INJURY, SUBSEQUENT ENCOUNTER: ICD-10-CM

## 2021-12-04 DIAGNOSIS — R51.9 NONINTRACTABLE HEADACHE, UNSPECIFIED CHRONICITY PATTERN, UNSPECIFIED HEADACHE TYPE: ICD-10-CM

## 2021-12-04 DIAGNOSIS — R42 DIZZINESS: ICD-10-CM

## 2021-12-04 PROCEDURE — 99214 OFFICE O/P EST MOD 30 MIN: CPT | Performed by: NURSE PRACTITIONER

## 2021-12-04 ASSESSMENT — ENCOUNTER SYMPTOMS
CONSTITUTIONAL NEGATIVE: 1
SPEECH CHANGE: 1
MEMORY LOSS: 0
CHILLS: 0
TREMORS: 1
CARDIOVASCULAR NEGATIVE: 1
LOSS OF CONSCIOUSNESS: 0
SENSORY CHANGE: 0
DIZZINESS: 1
FEVER: 0
SEIZURES: 0
HEADACHES: 1
WEAKNESS: 0
RESPIRATORY NEGATIVE: 1

## 2021-12-04 ASSESSMENT — FIBROSIS 4 INDEX: FIB4 SCORE: 0.72

## 2021-12-04 ASSESSMENT — VISUAL ACUITY: OU: 1

## 2021-12-04 NOTE — PATIENT INSTRUCTIONS
Dizziness  Dizziness is a common problem. It is a feeling of unsteadiness or light-headedness. You may feel like you are about to faint. Dizziness can lead to injury if you stumble or fall. Anyone can become dizzy, but dizziness is more common in older adults. This condition can be caused by a number of things, including medicines, dehydration, or illness.  Follow these instructions at home:  Eating and drinking  · Drink enough fluid to keep your urine clear or pale yellow. This helps to keep you from becoming dehydrated. Try to drink more clear fluids, such as water.  · Do not drink alcohol.  · Limit your caffeine intake if told to do so by your health care provider. Check ingredients and nutrition facts to see if a food or beverage contains caffeine.  · Limit your salt (sodium) intake if told to do so by your health care provider. Check ingredients and nutrition facts to see if a food or beverage contains sodium.  Activity  · Avoid making quick movements.  ? Rise slowly from chairs and steady yourself until you feel okay.  ? In the morning, first sit up on the side of the bed. When you feel okay, stand slowly while you hold onto something until you know that your balance is fine.  · If you need to  one place for a long time, move your legs often. Tighten and relax the muscles in your legs while you are standing.  · Do not drive or use heavy machinery if you feel dizzy.  · Avoid bending down if you feel dizzy. Place items in your home so that they are easy for you to reach without leaning over.  Lifestyle  · Do not use any products that contain nicotine or tobacco, such as cigarettes and e-cigarettes. If you need help quitting, ask your health care provider.  · Try to reduce your stress level by using methods such as yoga or meditation. Talk with your health care provider if you need help to manage your stress.  General instructions  · Watch your dizziness for any changes.  · Take over-the-counter and  prescription medicines only as told by your health care provider. Talk with your health care provider if you think that your dizziness is caused by a medicine that you are taking.  · Tell a friend or a family member that you are feeling dizzy. If he or she notices any changes in your behavior, have this person call your health care provider.  · Keep all follow-up visits as told by your health care provider. This is important.  Contact a health care provider if:  · Your dizziness does not go away.  · Your dizziness or light-headedness gets worse.  · You feel nauseous.  · You have reduced hearing.  · You have new symptoms.  · You are unsteady on your feet or you feel like the room is spinning.  Get help right away if:  · You vomit or have diarrhea and are unable to eat or drink anything.  · You have problems talking, walking, swallowing, or using your arms, hands, or legs.  · You feel generally weak.  · You are not thinking clearly or you have trouble forming sentences. It may take a friend or family member to notice this.  · You have chest pain, abdominal pain, shortness of breath, or sweating.  · Your vision changes.  · You have any bleeding.  · You have a severe headache.  · You have neck pain or a stiff neck.  · You have a fever.  These symptoms may represent a serious problem that is an emergency. Do not wait to see if the symptoms will go away. Get medical help right away. Call your local emergency services (911 in the [place] Do not drive yourself to the hospital.  Summary  · Dizziness is a feeling of unsteadiness or light-headedness. This condition can be caused by a number of things, including medicines, dehydration, or illness.  · Anyone can become dizzy, but dizziness is more common in older adults.  · Drink enough fluid to keep your urine clear or pale yellow. Do not drink alcohol.  · Avoid making quick movements if you feel dizzy. Monitor your dizziness for any changes.  This information is not intended to  replace advice given to you by your health care provider. Make sure you discuss any questions you have with your health care provider.  Document Released: 06/13/2002 Document Revised: 12/21/2018 Document Reviewed: 01/20/2018  Elsevier Patient Education © 2020 [employer]      Concussion, Adult    A concussion is a brain injury from a hard, direct hit (trauma) to the head or body. This direct hit causes the brain to shake quickly back and forth inside the skull. This can damage brain cells and cause chemical changes in the brain. A concussion may also be known as a mild traumatic brain injury (TBI).  Concussions are usually not life-threatening, but the effects of a concussion can be serious. If you have a concussion, you should be very careful to avoid having a second concussion.  What are the causes?  This condition is caused by:  · A direct hit to your head, such as:  ? Running into another player during a game.  ? Being hit in a fight.  ? Hitting your head on a hard surface.  · Sudden movement of your body that causes your brain to move back and forth inside the skull, such as in a car crash.  What are the signs or symptoms?  The signs of a concussion can be hard to notice. Early on, they may be missed by you, family members, and health care providers. You may look fine on the outside but may act or feel differently.  Symptoms are usually temporary and most often improve in 7-10 days. Some symptoms appear right away, but other symptoms may not show up for hours or days. If your symptoms last longer than normal, you may have post-concussion syndrome. Every head injury is different.  Physical symptoms  · Headaches. This can include a feeling of pressure in the head or migraine-like symptoms.  · Tiredness (fatigue).  · Dizziness.  · Problems with coordination or balance.  · Vision or hearing problems.  · Sensitivity to light or noise.  · Nausea or vomiting.  · Changes in eating or sleeping patterns.  · Numbness or  tingling.  · Seizure.  Mental and emotional symptoms  · Memory problems.  · Trouble concentrating, organizing, or making decisions.  · Slowness in thinking, acting or reacting, speaking, or reading.  · Irritability or mood changes.  · Anxiety or depression.  How is this diagnosed?  This condition is diagnosed based on:  · Your symptoms.  · A description of your injury.  You may also have tests, including:  · Imaging tests, such as a CT scan or MRI.  · Neuropsychological tests. These measure your thinking, understanding, learning, and remembering abilities.  How is this treated?  Treatment for this condition includes:  · Stopping sports or activity if you are injured. If you hit your head or show signs of concussion:  ? Do not return to sports or activities the same day.  ? Get checked by a health care provider before you return to your activities.  · Physical and mental rest and careful observation, usually at home. Gradually return to your normal activities.  · Medicines to help with symptoms such as headaches, nausea, or difficulty sleeping.  ? Avoid taking opioid pain medicine while recovering from a concussion.  · Avoiding alcohol and drugs. These may slow your recovery and can put you at risk of further injury.  · Referral to a concussion clinic or rehabilitation center.  Recovery from a concussion can take time. How fast you recover depends on many factors. Return to activities only when:  · Your symptoms are completely gone.  · Your health care provider says that it is safe.  Follow these instructions at home:  Activity  · Limit activities that require a lot of thought or concentration, such as:  ? Doing homework or job-related work.  ? Watching TV.  ? Working on the computer or phone.  ? Playing memory games and puzzles.  · Rest. Rest helps your brain heal. Make sure you:  ? Get plenty of sleep. Most adults should get 7-9 hours of sleep each night.  ? Rest during the day. Take naps or rest breaks when you  feel tired.  · Avoid physical activity like exercise until your health care provider says it is safe. Stop any activity that worsens symptoms.  · Do not do high-risk activities that could cause a second concussion, such as riding a bike or playing sports.  · Ask your health care provider when you can return to your normal activities, such as school, work, athletics, and driving. Your ability to react may be slower after a brain injury. Never do these activities if you are dizzy. Your health care provider will likely give you a plan for gradually returning to activities.  General instructions    · Take over-the-counter and prescription medicines only as told by your health care provider. Some medicines, such as blood thinners (anticoagulants) and aspirin, may increase the risk for complications, such as bleeding.  · Do not drink alcohol until your health care provider says you can.  · Watch your symptoms and tell others around you to do the same. Complications sometimes occur after a concussion. Older adults with a brain injury may have a higher risk of serious complications.  · Tell your , teachers, school nurse, school counselor, , or  about your injury, symptoms, and restrictions.  · Keep all follow-up visits as told by your health care provider. This is important.  How is this prevented?  Avoiding another brain injury is very important. In rare cases, another injury can lead to permanent brain damage, brain swelling, or death. The risk of this is greatest during the first 7-10 days after a head injury. Avoid injuries by:  · Stopping activities that could lead to a second concussion, such as contact or recreational sports, until your health care provider says it is okay.  · Taking these actions once you have returned to sports or activities:  ? Avoiding plays or moves that can cause you to crash into another person. This is how most concussions occur.  ? Following the rules and  being respectful of other players. Do not engage in violent or illegal plays.  · Getting regular exercise that includes strength and balance training.  · Wearing a properly fitting helmet during sports, biking, or other activities. Helmets can help protect you from serious skull and brain injuries, but they do not protect you from a concussion. Even when wearing a helmet, you should avoid being hit in the head.  Contact a health care provider if:  · Your symptoms get worse or they do not improve.  · You have new symptoms.  · You have another injury.  Get help right away if:  · You have severe or worsening headaches.  · You have weakness or numbness in any part of your body.  · You are confused.  · Your coordination gets worse.  · You vomit repeatedly.  · You are sleepier than normal.  · Your speech is slurred.  · You cannot recognize people or places.  · You have a seizure.  · It is difficult to wake you up.  · You have unusual behavior changes.  · You have changes in your vision.  · You lose consciousness.  Summary  · A concussion is a brain injury that results from a hard, direct hit (trauma) to your head or body.  · You may have imaging tests and neuropsychological tests to diagnose a concussion.  · Treatment for this condition includes physical and mental rest and careful observation.  · Ask your health care provider when you can return to your normal activities, such as school, work, athletics, and driving.  · Get help right away if you have a severe headache, weakness on one side of the body, seizures, behavior changes, changes in vision, or if you are confused or sleepier than normal.  This information is not intended to replace advice given to you by your health care provider. Make sure you discuss any questions you have with your health care provider.  Document Released: 03/09/2005 Document Revised: 08/08/2019 Document Reviewed: 08/08/2019  Elsevier Patient Education © 2020 [employer]      A referral  request has been placed for neurology. We have a referrals department that is tasked with locating a suitable office that currently accepts patients and your insurance and you should be receiving referral information from them such as the office name, address, and number. This process usually takes around 3 business days. If you are not contacted by our referrals department, please call [phone].

## 2021-12-04 NOTE — LETTER
December 4, 2021         Patient: Jersey Engel   YOB: 1995   Date of Visit: 12/4/2021           To Whom it May Concern:    Jersey Engel was seen in my clinic on 12/4/2021 due to illness. Due to medical necessity, please excuse patient from work for any absences that may have occurred due to his illness as well as for up to the next 7 days as needed or until advised by neurology.    If you have any questions or concerns, please don't hesitate to call.        Sincerely,         GRAZYNA Hernández.  Electronically Signed

## 2021-12-04 NOTE — PROGRESS NOTES
Subjective:     Jersey Engel is a 26 y.o. male who presents for Dizziness (dizziness x 2 weeks, hx head surgery 8 years of age, BFT 2 months ago, loopy, disorientated)       Dizziness  This is a new problem. The problem has been gradually worsening. Associated symptoms include headaches. Pertinent negatives include no chills, congestion, fever or weakness.     Prior external notes from unique source dated 11/27/2021 reviewed: seen in the ER with complaints of dizziness.  Noted history of subdural empyema following meningitis as a child.  History of seizures.  Was noted to have suffered a blunt head trauma 2 to 3 weeks prior requiring laceration repair.  Labs were obtained including alcohol level of 391.6 and breathalizer result of 0.243.  Urine drug screen was negative.  CBC and CMP were unremarkable.  CTA of the neck and head were normal.  Was given meclizine which patient reports did not help.    Comes into urgent care today as symptoms are unchanged.  Continues to feel dizzy.  Mother present.  Notes that patient continues to be having difficulty with his speech.  Hands seem to be shaky.  Patient reports a sensation of spinning, feeling loopy, and feeling disorientated.    He reports that 2 months ago, he had a mechanical fall down some stairs and struck the left side of his head.  Laceration there was repaired with staples which have since been removed.  Laceration has been healing well.    Patient was screened prior to rooming and denied COVID-19 diagnosis or contact with a person who has been diagnosed or is suspected to have COVID-19. During this visit, appropriate PPE was worn, hand hygiene was performed, and the patient and any visitors were masked.     PMH:  has no past medical history on file.    MEDS:   Current Outpatient Medications:   •  ibuprofen (MOTRIN) 800 MG Tab, Take 1 tablet by mouth every 8 hours as needed for Moderate Pain or Inflammation for up to 30 doses. (Patient not taking: Reported on  "12/4/2021), Disp: 30 tablet, Rfl: 0    ALLERGIES: No Known Allergies    SURGHX:   Past Surgical History:   Procedure Laterality Date   • OTHER NEUROLOGICAL SURG       SOCHX:  reports that he has quit smoking. His smoking use included pipe. He has never used smokeless tobacco. He reports current alcohol use of about 1.8 oz of alcohol per week. He reports previous drug use.     FH: Reviewed with patient, not pertinent to this visit.    Review of Systems   Constitutional: Negative.  Negative for chills, fever and malaise/fatigue.   HENT: Negative.  Negative for congestion and ear pain.    Respiratory: Negative.    Cardiovascular: Negative.    Neurological: Positive for dizziness, tremors, speech change and headaches. Negative for sensory change, seizures, loss of consciousness and weakness.   Endo/Heme/Allergies: Negative for environmental allergies.   Psychiatric/Behavioral: Negative for memory loss.   All other systems reviewed and are negative.    Additional details per HPI.      Objective:     /60 (BP Location: Left arm, Patient Position: Sitting, BP Cuff Size: Adult long)   Pulse (!) 103   Temp 36.3 °C (97.4 °F) (Temporal)   Resp 20   Ht 1.727 m (5' 8\")   Wt 69.9 kg (154 lb)   SpO2 97%   BMI 23.42 kg/m²     Physical Exam  Vitals reviewed.   Constitutional:       General: He is not in acute distress.     Appearance: He is well-developed. He is not ill-appearing or toxic-appearing.   HENT:      Head: Normocephalic.     Eyes:      General: Vision grossly intact.      Extraocular Movements: Extraocular movements intact.      Conjunctiva/sclera: Conjunctivae normal.      Pupils: Pupils are equal, round, and reactive to light.   Cardiovascular:      Rate and Rhythm: Normal rate.   Pulmonary:      Effort: Pulmonary effort is normal. No respiratory distress.   Musculoskeletal:         General: No deformity. Normal range of motion.      Cervical back: Normal range of motion.   Skin:     General: Skin is warm " and dry.      Coloration: Skin is not pale.   Neurological:      Mental Status: He is alert and oriented to person, place, and time.      GCS: GCS eye subscore is 4. GCS verbal subscore is 5. GCS motor subscore is 6.      Cranial Nerves: No dysarthria.      Sensory: No sensory deficit.      Motor: No weakness.      Coordination: Coordination normal.   Psychiatric:         Behavior: Behavior normal. Behavior is cooperative.       Assessment/Plan:     1. Dizziness  - Referral to Neurology    2. Closed head injury, subsequent encounter  - Referral to Neurology    3. Nonintractable headache, unspecified chronicity pattern, unspecified headache type  - CT-HEAD W/O; Future    Patient advised to follow-up with neurology for further evaluation and treatment.  Concern that his dizziness and symptoms may be related to his closed head injury 2 months ago.  Patient has a history of meningitis with subdural empyema when he was 8 years old.    Vital signs stable, afebrile, no acute distress at this time. Warning signs reviewed. Strict return/ER precautions advised.     Differential diagnosis, natural history, supportive care, over-the-counter symptom management per 's instructions, close monitoring, and indications for immediate follow-up discussed.     All questions answered. Patient agrees with the plan of care.    Discharge summary provided.    Work note provided.     Prior to discharge.  Patient started to complain of a migraine headache.  Mother and patient hesitant to go to the ER.  Mother interested in CT scan to ensure no change from prior which they will schedule at the next available opportunity.  Warning signs reviewed. Strict return/ER precautions advised.     Billing note: 30 minutes was allotted and spent for patient care and coordination of care (not reported separately) including preparing for the visit, obtaining/reviewing history from patient and mother, performing an exam/evaluation, ordering  referral/imaging, developing a plan of care, counseling/educating the patient, developing/printing/going over the discharge summary with the patient, producing a work note, updating the medical record, reconciling outside information, independent interpretation, reviewing unique test results/external notes from unique sources, and documentation. Care specific to this encounter was summarized here. Please refer to the chart for additional details on the care provided.

## 2023-05-02 ENCOUNTER — APPOINTMENT (OUTPATIENT)
Dept: RADIOLOGY | Facility: MEDICAL CENTER | Age: 28
End: 2023-05-02
Attending: EMERGENCY MEDICINE
Payer: MEDICAID

## 2023-05-02 ENCOUNTER — HOSPITAL ENCOUNTER (EMERGENCY)
Facility: MEDICAL CENTER | Age: 28
End: 2023-05-03
Attending: EMERGENCY MEDICINE
Payer: MEDICAID

## 2023-05-02 DIAGNOSIS — R07.89 CHEST WALL PAIN: ICD-10-CM
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
## 2023-05-02 DIAGNOSIS — F10.929 ALCOHOLIC INTOXICATION WITH COMPLICATION (HCC): ICD-10-CM

## 2023-05-02 LAB — POC BREATHALIZER: 0.26 PERCENT (ref 0–0.01)

## 2023-05-02 PROCEDURE — 96372 THER/PROPH/DIAG INJ SC/IM: CPT

## 2023-05-02 PROCEDURE — 302970 POC BREATHALIZER: Performed by: EMERGENCY MEDICINE

## 2023-05-02 PROCEDURE — 99285 EMERGENCY DEPT VISIT HI MDM: CPT

## 2023-05-02 PROCEDURE — 700111 HCHG RX REV CODE 636 W/ 250 OVERRIDE (IP): Mod: UD | Performed by: EMERGENCY MEDICINE

## 2023-05-02 PROCEDURE — 71045 X-RAY EXAM CHEST 1 VIEW: CPT

## 2023-05-02 RX ORDER — HALOPERIDOL 5 MG/ML
5 INJECTION INTRAMUSCULAR
Status: COMPLETED | OUTPATIENT
Start: 2023-05-02 | End: 2023-05-02

## 2023-05-02 RX ADMIN — HALOPERIDOL LACTATE 5 MG: 5 INJECTION, SOLUTION INTRAMUSCULAR at 21:15

## 2023-05-02 ASSESSMENT — FIBROSIS 4 INDEX: FIB4 SCORE: 0.77

## 2023-05-03 VITALS
OXYGEN SATURATION: 96 % | WEIGHT: 150 LBS | HEART RATE: 102 BPM | TEMPERATURE: 97 F | DIASTOLIC BLOOD PRESSURE: 80 MMHG | SYSTOLIC BLOOD PRESSURE: 128 MMHG | HEIGHT: 67 IN | BODY MASS INDEX: 23.54 KG/M2 | RESPIRATION RATE: 18 BRPM

## 2023-05-03 LAB — POC BREATHALIZER: 0.19 PERCENT (ref 0–0.01)

## 2023-05-03 PROCEDURE — 96372 THER/PROPH/DIAG INJ SC/IM: CPT
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
## 2023-05-03 NOTE — ED NOTES
Pt discharged independent and ambulatory with steady gait with all belongings into law enforcement custody. Discharge instructions reviewed with pt and pt has no follow up questions. Vitals and condition stable for discharge. Patient's mother notified of patient's discharge upon patient request.

## 2023-05-03 NOTE — ED NOTES
Pt ambulatory and independent to the bathroom to urinate. Provided pt with water and pt tolerated activity well. ERP notified.
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
## 2023-05-03 NOTE — ED NOTES
Pt resting comfortably, respirations are regular and unlabored. 2 restraints removed from pt's right leg and left wrist. Pt is calm but still making snide remarks to staff. Pt reminded about the restraint process and that if he cooperates and does not pose a danger to staff, his remaining restraints can come off.

## 2023-05-03 NOTE — ED NOTES
Vitals reassessed.   Vitals:    05/03/23 0023   BP: 120/79   Pulse: (!) 127   Resp: 16   Temp:    SpO2: 97%

## 2024-05-25 ENCOUNTER — APPOINTMENT (OUTPATIENT)
Dept: RADIOLOGY | Facility: MEDICAL CENTER | Age: 29
End: 2024-05-25
Attending: EMERGENCY MEDICINE
Payer: MEDICAID

## 2024-05-25 ENCOUNTER — HOSPITAL ENCOUNTER (EMERGENCY)
Facility: MEDICAL CENTER | Age: 29
End: 2024-05-25
Attending: EMERGENCY MEDICINE
Payer: MEDICAID

## 2024-05-25 VITALS
OXYGEN SATURATION: 95 % | DIASTOLIC BLOOD PRESSURE: 69 MMHG | HEART RATE: 117 BPM | SYSTOLIC BLOOD PRESSURE: 131 MMHG | BODY MASS INDEX: 24.99 KG/M2 | TEMPERATURE: 98 F | HEIGHT: 65 IN | WEIGHT: 150 LBS | RESPIRATION RATE: 18 BRPM

## 2024-05-25 DIAGNOSIS — F10.929 ALCOHOLIC INTOXICATION WITH COMPLICATION (HCC): ICD-10-CM

## 2024-05-25 DIAGNOSIS — R41.82 ALTERED MENTAL STATUS, UNSPECIFIED ALTERED MENTAL STATUS TYPE: ICD-10-CM

## 2024-05-25 LAB
ALBUMIN SERPL BCP-MCNC: 4.8 G/DL (ref 3.2–4.9)
ALBUMIN/GLOB SERPL: 1.8 G/DL
ALP SERPL-CCNC: 71 U/L (ref 30–99)
ALT SERPL-CCNC: 33 U/L (ref 2–50)
AMMONIA PLAS-SCNC: 29 UMOL/L (ref 11–45)
AMPHET UR QL SCN: NEGATIVE
ANION GAP SERPL CALC-SCNC: 19 MMOL/L (ref 7–16)
APTT PPP: 26.4 SEC (ref 24.7–36)
AST SERPL-CCNC: 24 U/L (ref 12–45)
BARBITURATES UR QL SCN: NEGATIVE
BASOPHILS # BLD AUTO: 0.6 % (ref 0–1.8)
BASOPHILS # BLD: 0.04 K/UL (ref 0–0.12)
BENZODIAZ UR QL SCN: NEGATIVE
BILIRUB SERPL-MCNC: 0.3 MG/DL (ref 0.1–1.5)
BUN SERPL-MCNC: 9 MG/DL (ref 8–22)
BZE UR QL SCN: NEGATIVE
CALCIUM ALBUM COR SERPL-MCNC: 8.2 MG/DL (ref 8.5–10.5)
CALCIUM SERPL-MCNC: 8.8 MG/DL (ref 8.5–10.5)
CANNABINOIDS UR QL SCN: NEGATIVE
CHLORIDE SERPL-SCNC: 108 MMOL/L (ref 96–112)
CO2 SERPL-SCNC: 17 MMOL/L (ref 20–33)
CREAT SERPL-MCNC: 0.73 MG/DL (ref 0.5–1.4)
EOSINOPHIL # BLD AUTO: 0.01 K/UL (ref 0–0.51)
EOSINOPHIL NFR BLD: 0.2 % (ref 0–6.9)
ERYTHROCYTE [DISTWIDTH] IN BLOOD BY AUTOMATED COUNT: 41.3 FL (ref 35.9–50)
ETHANOL BLD-MCNC: 388.9 MG/DL
FENTANYL UR QL: NEGATIVE
GFR SERPLBLD CREATININE-BSD FMLA CKD-EPI: 125 ML/MIN/1.73 M 2
GLOBULIN SER CALC-MCNC: 2.7 G/DL (ref 1.9–3.5)
GLUCOSE SERPL-MCNC: 126 MG/DL (ref 65–99)
HCT VFR BLD AUTO: 48.3 % (ref 42–52)
HGB BLD-MCNC: 17 G/DL (ref 14–18)
IMM GRANULOCYTES # BLD AUTO: 0.01 K/UL (ref 0–0.11)
IMM GRANULOCYTES NFR BLD AUTO: 0.2 % (ref 0–0.9)
INR PPP: 1.06 (ref 0.87–1.13)
LIPASE SERPL-CCNC: 26 U/L (ref 11–82)
LYMPHOCYTES # BLD AUTO: 2.86 K/UL (ref 1–4.8)
LYMPHOCYTES NFR BLD: 46.3 % (ref 22–41)
MCH RBC QN AUTO: 30.5 PG (ref 27–33)
MCHC RBC AUTO-ENTMCNC: 35.2 G/DL (ref 32.3–36.5)
MCV RBC AUTO: 86.7 FL (ref 81.4–97.8)
METHADONE UR QL SCN: NEGATIVE
MONOCYTES # BLD AUTO: 0.33 K/UL (ref 0–0.85)
MONOCYTES NFR BLD AUTO: 5.3 % (ref 0–13.4)
NEUTROPHILS # BLD AUTO: 2.93 K/UL (ref 1.82–7.42)
NEUTROPHILS NFR BLD: 47.4 % (ref 44–72)
NRBC # BLD AUTO: 0 K/UL
NRBC BLD-RTO: 0 /100 WBC (ref 0–0.2)
OPIATES UR QL SCN: NEGATIVE
OXYCODONE UR QL SCN: NEGATIVE
PCP UR QL SCN: NEGATIVE
PLATELET # BLD AUTO: 311 K/UL (ref 164–446)
PMV BLD AUTO: 9.7 FL (ref 9–12.9)
POTASSIUM SERPL-SCNC: 3.7 MMOL/L (ref 3.6–5.5)
PROPOXYPH UR QL SCN: NEGATIVE
PROT SERPL-MCNC: 7.5 G/DL (ref 6–8.2)
PROTHROMBIN TIME: 13.9 SEC (ref 12–14.6)
RBC # BLD AUTO: 5.57 M/UL (ref 4.7–6.1)
SODIUM SERPL-SCNC: 144 MMOL/L (ref 135–145)
WBC # BLD AUTO: 6.2 K/UL (ref 4.8–10.8)

## 2024-05-25 RX ORDER — HALOPERIDOL 5 MG/ML
5 INJECTION INTRAMUSCULAR ONCE
Status: COMPLETED | OUTPATIENT
Start: 2024-05-25 | End: 2024-05-25

## 2024-05-25 RX ORDER — SODIUM CHLORIDE, SODIUM LACTATE, POTASSIUM CHLORIDE, CALCIUM CHLORIDE 600; 310; 30; 20 MG/100ML; MG/100ML; MG/100ML; MG/100ML
1000 INJECTION, SOLUTION INTRAVENOUS ONCE
Status: DISCONTINUED | OUTPATIENT
Start: 2024-05-25 | End: 2024-05-25 | Stop reason: HOSPADM

## 2024-05-25 RX ORDER — DIPHENHYDRAMINE HYDROCHLORIDE 50 MG/ML
25 INJECTION INTRAMUSCULAR; INTRAVENOUS ONCE
Status: COMPLETED | OUTPATIENT
Start: 2024-05-25 | End: 2024-05-25

## 2024-05-25 RX ORDER — DIAZEPAM 5 MG/ML
5 INJECTION, SOLUTION INTRAMUSCULAR; INTRAVENOUS ONCE
Status: COMPLETED | OUTPATIENT
Start: 2024-05-25 | End: 2024-05-25

## 2024-05-25 RX ADMIN — DIAZEPAM 5 MG: 10 INJECTION, SOLUTION INTRAMUSCULAR; INTRAVENOUS at 01:15

## 2024-05-25 RX ADMIN — HALOPERIDOL LACTATE 5 MG: 5 INJECTION, SOLUTION INTRAMUSCULAR at 01:15

## 2024-05-25 RX ADMIN — DIPHENHYDRAMINE HYDROCHLORIDE 25 MG: 50 INJECTION, SOLUTION INTRAMUSCULAR; INTRAVENOUS at 01:15

## 2024-05-25 RX ADMIN — MAGNESIUM SULFATE HEPTAHYDRATE: 500 INJECTION, SOLUTION INTRAMUSCULAR; INTRAVENOUS at 01:44

## 2024-05-25 NOTE — ED NOTES
Rounded on pt. Pt sleeping in gurney, visible chest rise noted. NAD at this time. 1:1 sitter remains in direct view of patient.
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
## 2024-05-25 NOTE — ED NOTES
Pt pulled PIV, ralley bag draining onto floor. [place] vest placed on pt.  Pt on bed alarm, RN reminded pt to stay in bed.

## 2024-05-25 NOTE — ED NOTES
ERP at bedside for re-eval.  Pt provided discharge instructions. Pt verbalized understanding of all instructions. Pt ambulatory to lobby w/ steady gait.

## 2024-05-25 NOTE — ED TRIAGE NOTES
"Chief Complaint   Patient presents with    Alcohol Intoxication    ALOC     Pt BIBA from home, EMS report family called due to pt in intoxicated with alcohol, unsteady gait and in and out with consciousness. EMS FSBS 125. Pt. Aox2, pt does not give appropriate answers. As per EMS pt denies SI/HI.      BP (!) 142/87   Pulse (!) 111   Temp 36.5 °C (97.7 °F) (Temporal)   Resp 19   Ht 1.651 m (5' 5\")   Wt 68 kg (150 lb)   SpO2 98%   BMI 24.96 kg/m²        Pt on bed alarm. Alcohol breathalizer: 0.306  "

## 2024-05-25 NOTE — ED NOTES
Bedside report from CURRY Talavera. Pt on leodanrtiffanie in lowest, locked position, on RA. Fall precautions in place, including fall risk sign, and bed alarm. Pt updated on plan of care.

## 2024-05-25 NOTE — ED NOTES
Bedside report received from off going RN: Loki, assumed care of patient.  POC discussed with patient. Call light within reach, all needs addressed at this time.       Fall risk interventions in place: Move the patient closer to the nurse's station, Patient's personal possessions are with in their safe reach, Place socks on patient, Place fall risk sign on patient's door, Keep floor surfaces clean and dry, Bed Alarm in use, and Restraint vest (all applicable per [place] Fall risk assessment)   Continuous monitoring: Pulse Ox or Blood Pressure  IVF/IV medications: Not Applicable   Oxygen: Room Air  Bedside sitter: Not Applicable   Isolation: Not Applicable

## 2024-05-25 NOTE — ED NOTES
"Pt removed vest, up out of bed multiple times. Redirectable at this time, repetitively asking \"can I leave\". Charge RN aware, sitter requested.  "

## 2024-05-25 NOTE — ED NOTES
Pt continues to sleep in [facility] with even, unlabored respirations. 1:1 sitter remains in direct view of patient.

## 2024-05-25 NOTE — ED NOTES
Pt. Getting out of bed, restless, redirectable. Security at bedside      Pt talking back to security  ERP informed

## 2024-05-25 NOTE — DISCHARGE INSTRUCTIONS
Strongly recommend that you speak with your family about your alcohol use pattern as it is becoming quite problematic and would recommend getting some help.  2 visits for alcohol intoxication in 1 month is never a good thing

## 2024-05-25 NOTE — ED NOTES
Pt. Restless, redirectable but keep getting out of bed, pt trying to removed IV and O2.     ERP ordered non- violent restraint.    Placed soft restraint for safety.

## 2025-03-20 ENCOUNTER — HOSPITAL ENCOUNTER (EMERGENCY)
Facility: MEDICAL CENTER | Age: 30
End: 2025-03-20
Attending: EMERGENCY MEDICINE
Payer: MEDICAID

## 2025-03-20 VITALS
BODY MASS INDEX: 28.25 KG/M2 | HEART RATE: 85 BPM | TEMPERATURE: 98.2 F | DIASTOLIC BLOOD PRESSURE: 85 MMHG | RESPIRATION RATE: 16 BRPM | HEIGHT: 65 IN | SYSTOLIC BLOOD PRESSURE: 127 MMHG | WEIGHT: 169.53 LBS | OXYGEN SATURATION: 95 %

## 2025-03-20 DIAGNOSIS — F10.920 ALCOHOLIC INTOXICATION WITHOUT COMPLICATION (HCC): ICD-10-CM

## 2025-03-20 DIAGNOSIS — R45.851 SUICIDAL IDEATION: ICD-10-CM

## 2025-03-20 LAB
AMPHET UR QL SCN: NEGATIVE
BARBITURATES UR QL SCN: NEGATIVE
BENZODIAZ UR QL SCN: NEGATIVE
BZE UR QL SCN: NEGATIVE
CANNABINOIDS UR QL SCN: POSITIVE
FENTANYL UR QL: NEGATIVE
METHADONE UR QL SCN: NEGATIVE
OPIATES UR QL SCN: NEGATIVE
OXYCODONE UR QL SCN: NEGATIVE
PCP UR QL SCN: NEGATIVE
POC BREATHALIZER: 0.07 PERCENT (ref 0–0.01)
POC BREATHALIZER: 0.09 PERCENT (ref 0–0.01)
POC BREATHALIZER: 0.14 PERCENT (ref 0–0.01)
POC BREATHALIZER: 0.21 PERCENT (ref 0–0.01)
PROPOXYPH UR QL SCN: NEGATIVE

## 2025-03-20 PROCEDURE — 302970 POC BREATHALIZER: Performed by: EMERGENCY MEDICINE

## 2025-03-20 PROCEDURE — 90791 PSYCH DIAGNOSTIC EVALUATION: CPT

## 2025-03-20 PROCEDURE — 99285 EMERGENCY DEPT VISIT HI MDM: CPT

## 2025-03-20 PROCEDURE — 80307 DRUG TEST PRSMV CHEM ANLYZR: CPT

## 2025-03-20 PROCEDURE — A9270 NON-COVERED ITEM OR SERVICE: HCPCS | Mod: UD | Performed by: STUDENT IN AN ORGANIZED HEALTH CARE EDUCATION/TRAINING PROGRAM

## 2025-03-20 PROCEDURE — 302970 POC BREATHALIZER

## 2025-03-20 PROCEDURE — 700102 HCHG RX REV CODE 250 W/ 637 OVERRIDE(OP): Mod: UD | Performed by: STUDENT IN AN ORGANIZED HEALTH CARE EDUCATION/TRAINING PROGRAM

## 2025-03-20 PROCEDURE — 302970 POC BREATHALIZER: Performed by: STUDENT IN AN ORGANIZED HEALTH CARE EDUCATION/TRAINING PROGRAM

## 2025-03-20 RX ORDER — NICOTINE 21 MG/24HR
1 PATCH, TRANSDERMAL 24 HOURS TRANSDERMAL ONCE
Status: DISCONTINUED | OUTPATIENT
Start: 2025-03-20 | End: 2025-03-21 | Stop reason: HOSPADM

## 2025-03-20 RX ADMIN — NICOTINE 14 MG: 14 PATCH TRANSDERMAL at 17:07

## 2025-03-20 ASSESSMENT — FIBROSIS 4 INDEX: FIB4 SCORE: 0.4

## 2025-03-20 NOTE — ED NOTES
Dr. Nolan at bedside.Per Dr. Nolan, patient okay to have water. Patient provided with water. 1:1 sitter at bedside and L2K safety precautions in place.

## 2025-03-20 NOTE — ED NOTES
Break RN: pt resting in room. Pt has a 1:1 sitter at this time. No acute distress noted. Pt requesting a nicotine patch. ERP aware. Will continue to monitor while primary RN is on break.
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
## 2025-03-20 NOTE — ED NOTES
Patient ambulatory to bathroom and changed into paper scrubs. All personal belongings removed from patient. Urine samples collected and sent to lab. POC breathalizer obtained: 0.214

## 2025-03-20 NOTE — ED PROVIDER NOTES
"ED Provider Note    CHIEF COMPLAINT  Chief Complaint   Patient presents with    Suicidal Ideation     X3wks , lost friend has been drinking alcohol    Laceration     Superficial laceration left wrist x2days       EXTERNAL RECORDS REVIEWED  Reviewed previous ED visits and labs.    HPI/ROS  LIMITATION TO HISTORY   Select: : None  OUTSIDE HISTORIAN(S):  None.    Jersey Engel is a 29 y.o. male who presents to the emergency department complaint of depression and suicidal ideation.  The patient states he is been suicidal for 3 weeks.  Some drinking alcohol and he sustained some superficial wounds to his extremities.  Denies any other acute concerns or complaints.  Denies any other ingestion or any other self-harm.  Denies any medical complaints.    PAST MEDICAL HISTORY   History of alcohol use.    SURGICAL HISTORY   has a past surgical history that includes other neurological surg.    FAMILY HISTORY  No family history on file.    SOCIAL HISTORY  Social History     Tobacco Use    Smoking status: Former     Types: Pipe    Smokeless tobacco: Never   Vaping Use    Vaping status: Some Days    Substances: Nicotine   Substance and Sexual Activity    Alcohol use: Yes     Alcohol/week: 1.8 oz     Types: 3 Standard drinks or equivalent per week     Comment: daily    Drug use: Not Currently     Comment: cocain (snorted)    Sexual activity: Not on file       CURRENT MEDICATIONS  Home Medications    **Home medications have not yet been reviewed for this encounter**         ALLERGIES  No Known Allergies    PHYSICAL EXAM  VITAL SIGNS: BP (!) 135/93   Pulse 95   Temp 36.8 °C (98.2 °F) (Temporal)   Resp 14   Ht 1.651 m (5' 5\")   Wt 76.9 kg (169 lb 8.5 oz)   SpO2 96%   BMI 28.21 kg/m²    Constitutional: Well developed, Well nourished, No acute distress,  HENT: Normocephalic, Atraumatic,  Eyes: PERRL, EOMI, Conjunctiva normal, No discharge.   Neck: Normal range of motion  Cardiovascular: Normal heart rate, Normal rhythm, No " murmurs,   Thorax & Lungs: Normal breath sounds, No respiratory distress, No wheezing,  Abdomen:Soft, No tenderness  Skin: Warm, Dry, No erythema, No rash.     Musculoskeletal: Good range of motion in all major joints.   Neurologic: Alert,  No focal deficits noted.         EKG/LABS  Results for orders placed or performed during the hospital encounter of 03/20/25   POC BREATHALIZER    Collection Time: 03/20/25  3:36 PM   Result Value Ref Range    POC Breathalizer 0.214 (A) 0.00 - 0.01 Percent      I have independently interpreted this EKG    RADIOLOGY/PROCEDURES   I have independently interpreted the diagnostic imaging associated with this visit and am waiting the final reading from the radiologist.   No imaging indicated.      COURSE & MEDICAL DECISION MAKING    ASSESSMENT, COURSE AND PLAN  Care Narrative:   29-year-old male with history of alcohol abuse presents to the ED with depression and suicidal ideation.  Is some superficial wounds that do not require closure.  Which was tetanus is up-to-date.  He is placed on a legal hold.  He will be observed till sober and then was seen by behavioral health.  His care was transferred to my partner.          ED OBS: Yes; I am placing the patient in to an observation status due to a diagnostic uncertainty as well as therapeutic intensity. Patient placed in observation status at 3:51 PM, 3/20/2025.     Observation plan is as follows: Patient is admitted to observation to metabolize his alcohol and seen by his behavioral health.              ADDITIONAL PROBLEMS MANAGED  History of alcohol abuse.    DISPOSITION AND DISCUSSIONS  I have discussed management of the patient with the following physicians and BLAS's: Care transferred my partner at 1600.        Escalation of care considered, and ultimately not performed:Laboratory analysis      FINAL DIAGNOSIS  1. Suicidal ideation    2. Alcoholic intoxication without complication (HCC)         Electronically signed by: Waqas VELASCO  OLYA Nolan, 3/20/2025 3:47 PM

## 2025-03-21 NOTE — DISCHARGE PLANNING
Alert Team:    Alert team aware of behavioral health consult and will assess once clinically sober.

## 2025-03-21 NOTE — ED NOTES
Pt anxious and states he wants to leave or at least get his belongings back. Explained to pt that this is not possible until he is seen by the ALERT Team after his breathalizer is <0.08, pt demonstrated understanding. 1:1 sitter still in view of pt.

## 2025-03-21 NOTE — DISCHARGE PLANNING
@22:17 - - Coordinated care with behavioral health and ED staff, social workers, and other service providers to prepare DC plan for the PT.  Alert RN reports PT struggling with ETOH intoxication and grief over recent death of a friend.  PT is on a L2K for SI and Alert RN requested peer support to round with PT and provide resources for grief support and ETOH use.      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  PT is  and has family and support in the area.  PT has Medicaid insurance and his MCO is [place] [employer].

## 2025-03-21 NOTE — ED NOTES
Medication history reviewed with patient at bedside.   Med rec is complete  Allergies reviewed.     Patient has not had any outpatient antibiotics in the last 30 days.   Anticoagulants:No   Dispense history available in EPIC: No    Patient denies taking any rx or otc medications.    Carlitos Zacarias PhT

## 2025-03-21 NOTE — ED NOTES
Patient resting in bed with eyes closed at this time. Equal chest rise and fall noted. No acute distress. 1:1 sitter at bedside and L2K safety precautions in place.

## 2025-03-21 NOTE — ED NOTES
Patients girlfriend, Elif, to [facility] requesting update on patient. Asking to be contacted with updates about patient if he were to be discharged or transferred to another facility.     Elif: [phone]

## 2025-03-21 NOTE — ED NOTES
Pt resting in bed with eyes closed in view of 1:1 sitter and RN station. No acute distress is noted at this time and pt's breaths are even and unlabored. Will continue to monitor.

## 2025-03-21 NOTE — ED NOTES
Pt keeps telling 1:1 sitter that he is ready to go home now. This RN explained the legal hold process to pt based on his previous comments about wanting to hurt himself. Pt states that he does not feel suicidal at this time. Explained to pt that he needs to be evaluated by the ALERT Team, which cannot be done until he is under the legal limit, pt demonstrated understanding.

## 2025-03-21 NOTE — ED NOTES
Legal hold discontinued per ERP and pt up for d/c so pt given his clothing to change back into. Pt educated regarding discharge instructions and demonstrated understanding. Pt ambulatory upon discharge and does not appear to be in any distress at this time. Pt's discharge paperwork and belongings sent home with pt.

## 2025-03-21 NOTE — ED NOTES
Received report from CURRY Veliz. Upon shift change pt is resting in bed with even and unlabored breaths, in no apparent distress. Suicide risk precautions in place, including 1:1 sitter. Will continue to monitor.

## 2025-03-21 NOTE — ED NOTES
Patient resting in bed watching TV. No acute distress. 1:1 sitter at bedside. L2k precautions remain in place.

## 2025-03-21 NOTE — CONSULTS
"[facility]   TRIAGE ASSESSMENT    Name: Jersey Engel  MRN: [id number]  : 1995  Age: 29 y.o.  Date of assessment: 3/20/2025  PCP: Pcp Pt States None  Persons in attendance: Patient  Patient Location: [facility]    CHIEF COMPLAINT/PRESENTING ISSUE (as stated by pt): Pt is a 30 y/o male presenting to the ED for a psychiatric evaluation for suicidal ideation and self-harm. The pt states he lost a friend this week and has been \"self-medicating with alcohol.\" The pt cut left wrist a few days ago; superficial lacerations noted. Breathalyzer on arrival to ED was 0.214 at 1536. He was assessed by this writer for behavioral health consult once breathalyzer was 0.067 at 2205. At this time, pt denies SI/HI/hallucinations and denies suicidal/self harm/psych history. Pt states he has been feeling depressed recently especially this week since his friend passed away. He has never attempted to hurt himself in the past. He has never received inpatient or outpatient psychiatric services, but it certainly interested in doing so. He is interested in speaking with a therapist to process feelings and also possibly interested in medication management for depression. The pt is able to contract for safety. Findings discussed with ERP who agrees pt is safe to discharge to self. Legal hold discontinued by ERP.  provided psychiatric and grief resource lists to pt; verbalized understanding of resources and all questions answered. Pt called girlfriend to pick him up from the ED upon discharge.   Chief Complaint   Patient presents with    Suicidal Ideation     X3wks , lost friend has been drinking alcohol    Laceration     Superficial laceration left wrist x2days        CURRENT LIVING SITUATION/SOCIAL SUPPORT/FINANCIAL RESOURCES: Lives with girlfriend and works full-time at [employer]. Reports sufficient support system.     BEHAVIORAL HEALTH/SUBSTANCE USE TREATMENT " HISTORY  Does patient/parent report a history of prior behavioral health/substance use treatment for patient?   No:    SAFETY ASSESSMENT - SELF  Does patient acknowledge current or past symptoms of dangerousness to self or is previous history noted? Yes; pt reports making suicidal comments while intoxicated earlier today d/t grief response from friend passing away; superficial lacerations noted to left wrist from self-harm two days ago; no SI/self harm/attempts prior to this week. Pt now denies SI and is able to contract for safety.   Does parent/significant other report patient has current or past symptoms of dangerousness to self? N\A  Does presenting problem suggest symptoms of dangerousness to self? No; denies SI.    SAFETY ASSESSMENT - OTHERS  Does patient acknowledge current or past symptoms of aggressive behavior or risk to others or is previous history noted? no  Does parent/significant other report patient has current or past symptoms of aggressive behavior or risk to others?  N\A  Does presenting problem suggest symptoms of dangerousness to others? No; denies HI.    LEGAL HISTORY  Does patient acknowledge history of arrest/care home/FPC or is previous history noted? no    Crisis Safety Plan completed and copy given to patient? yes    ABUSE/NEGLECT SCREENING  Does patient report feeling “unsafe” in his/her home, or afraid of anyone?  no  Does patient report any history of physical, sexual, or emotional abuse?  no  Does parent or significant other report any of the above? N\A  Is there evidence of neglect by self?  no  Is there evidence of neglect by a caregiver? N/A  Does the patient/parent report any history of CPS/APS/police involvement related to suspected abuse/neglect or domestic violence? no  Based on the information provided during the current assessment, is a mandated report of suspected abuse/neglect being made?  No    SUBSTANCE USE SCREENING  Yes:  [place] all substances used in the past 30 days:       "Last Use Amount   [x]   Alcohol 03/20/2025 Not specified   [x]   Marijuana Not specified Not specified   []   Heroin     []   Prescription Opioids  (used without prescription, for    recreation, or in excess of prescribed amount)     []   Other Prescription  (used without prescription, for    recreation, or in excess of prescribed amount)     []   Cocaine      []   Methamphetamine     []   \"\" drugs (ectasy, MDMA)     []   Other substances        UDS results: +thc  Breathalyzer results: 0.214 @ 1536; 0.067 @ 2205        MENTAL STATUS   Participation: Active verbal participation, Attentive, Engaged, and Open to feedback  Grooming: Casual  Orientation: Alert and Fully Oriented  Behavior: Calm  Eye contact: Good  Mood: Euthymic  Affect: Flexible and Full range  Thought process: Logical and Goal-directed  Thought content: Within normal limits  Speech: Rate within normal limits and Volume within normal limits  Perception: Within normal limits  Memory:  No gross evidence of memory deficits  Insight: Adequate  Judgment:  Adequate  Other:    Collateral information:   Source:  [] Significant other present in person:   [] Significant other by telephone  [] [facility]   [x] [facility] Nursing Staff  [x] [facility]  [x] Other: ERP    [] Unable to complete full assessment due to:  [] Acute intoxication  [] Patient declined to participate/engage  [] Patient verbally unresponsive  [] Significant cognitive deficits  [] Significant perceptual distortions or behavioral disorganization  [x] Other: N/A     CLINICAL IMPRESSIONS:  Primary:  Suicidal Ideation-Resolved  Secondary:  Alcohol intoxication-Now clinically sober       IDENTIFIED NEEDS/PLAN:  [Trigger DISPOSITION list for any items marked]    []  Imminent safety risk - self [] Imminent safety risk - others   []  Acute substance withdrawal []  Psychosis/Impaired reality testing   [x]  Mood/anxiety []  Substance use/Addictive behavior   [x]  Maladaptive " behaviro []  Parent/child conflict   []  Family/Couples conflict []  Biomedical   []  Housing []  Financial   []   Legal  Occupational/Educational   []  Domestic violence [x]  Other: Grief     Recommended Plan of Care:  Actively being addressed by [facility]  Findings discussed with ERP who agrees pt is safe to discharge to self. Legal hold discontinued by ERP.  provided psychiatric and grief resource lists to pt; verbalized understanding of resources and all questions answered. Pt called girlfriend to pick him up from the ED upon discharge.     Has the Recommended Plan of Care/Level of Observation been reviewed with the patient's assigned nurse? Yes; sitter released due to discharge    Does patient/parent or guardian express agreement with the above plan? yes      Referral appointment(s) scheduled? N\A    Alert team only:   I have discussed findings and recommendations with Dr. Sanchez who is in agreement with these recommendations.     Referral information sent to the following outpatient community providers : [facility]    Referral information sent to the following inpatient community providers : N/A    If applicable : Referred  to  Alert Team for legal hold follow up at (time): discontinued by HUGO Antoine RDENTON.  3/20/2025

## 2025-03-21 NOTE — DISCHARGE PLANNING
@23:00 - - Gathered resources and rounded on PT. Facilitated 1:1 peer support session to promote wellness, self-care, and recovery-oriented practices. Modeled recovery, demonstrating hope, resilience, and self-determination in overcoming obstacles.  Utilizing personal lived experience to offer empathy, understanding, and encouragement to PT facing similar challenges. Educated PT in accessing community resources, , and other support networks to enhance their overall well-being and quality of life. PT provided with Mental Health/Counseling/Psychiatry Resources, including the [facility], substance use treatment options, and the [facility] crisis line.      PT was awake, alert and communicative.  PT was able to vocalize needs and wants, and was receptive and grateful for the resources.  PT's L2K was lifted by ERP and PT is being prepared for imminent DC.